# Patient Record
Sex: MALE | Race: WHITE | Employment: FULL TIME | URBAN - METROPOLITAN AREA
[De-identification: names, ages, dates, MRNs, and addresses within clinical notes are randomized per-mention and may not be internally consistent; named-entity substitution may affect disease eponyms.]

---

## 2017-06-29 PROCEDURE — 87086 URINE CULTURE/COLONY COUNT: CPT | Performed by: UROLOGY

## 2018-02-08 PROCEDURE — 87086 URINE CULTURE/COLONY COUNT: CPT | Performed by: UROLOGY

## 2018-03-14 PROCEDURE — 88305 TISSUE EXAM BY PATHOLOGIST: CPT | Performed by: UROLOGY

## 2018-03-14 PROCEDURE — 88344 IMHCHEM/IMCYTCHM EA MLT ANTB: CPT | Performed by: UROLOGY

## 2018-03-29 PROBLEM — C61 PROSTATE CANCER (HCC): Status: ACTIVE | Noted: 2018-03-29

## 2018-10-11 PROCEDURE — 88305 TISSUE EXAM BY PATHOLOGIST: CPT | Performed by: UROLOGY

## 2018-11-29 PROCEDURE — 88305 TISSUE EXAM BY PATHOLOGIST: CPT | Performed by: UROLOGY

## 2021-03-20 ENCOUNTER — LAB ENCOUNTER (OUTPATIENT)
Dept: LAB | Facility: HOSPITAL | Age: 81
End: 2021-03-20
Attending: UROLOGY
Payer: MEDICARE

## 2021-03-20 DIAGNOSIS — C61 PROSTATE CANCER (HCC): ICD-10-CM

## 2021-03-21 LAB — SARS-COV-2 RNA RESP QL NAA+PROBE: NOT DETECTED

## 2021-03-23 ENCOUNTER — HOSPITAL ENCOUNTER (OUTPATIENT)
Facility: HOSPITAL | Age: 81
Discharge: HOME | End: 2021-03-24
Attending: UROLOGY | Admitting: UROLOGY
Payer: MEDICARE

## 2021-03-23 ENCOUNTER — ANESTHESIA EVENT (OUTPATIENT)
Dept: SURGERY | Facility: HOSPITAL | Age: 81
End: 2021-03-23
Payer: MEDICARE

## 2021-03-23 ENCOUNTER — ANESTHESIA (OUTPATIENT)
Dept: SURGERY | Facility: HOSPITAL | Age: 81
End: 2021-03-23
Payer: MEDICARE

## 2021-03-23 DIAGNOSIS — N40.1 HYPERTROPHY OF PROSTATE WITH URINARY OBSTRUCTION: ICD-10-CM

## 2021-03-23 DIAGNOSIS — N13.8 HYPERTROPHY OF PROSTATE WITH URINARY OBSTRUCTION: ICD-10-CM

## 2021-03-23 DIAGNOSIS — N40.3 NODULAR PROSTATE WITH URINARY OBSTRUCTION: ICD-10-CM

## 2021-03-23 DIAGNOSIS — R33.9 RETENTION OF URINE, UNSPECIFIED: ICD-10-CM

## 2021-03-23 DIAGNOSIS — C61 PROSTATE CANCER (HCC): Primary | ICD-10-CM

## 2021-03-23 DIAGNOSIS — N13.8 NODULAR PROSTATE WITH URINARY OBSTRUCTION: ICD-10-CM

## 2021-03-23 LAB
GLUCOSE BLD-MCNC: 133 MG/DL (ref 70–99)
GLUCOSE BLD-MCNC: 145 MG/DL (ref 70–99)
GLUCOSE BLD-MCNC: 149 MG/DL (ref 70–99)
GLUCOSE BLD-MCNC: 158 MG/DL (ref 70–99)

## 2021-03-23 PROCEDURE — 82962 GLUCOSE BLOOD TEST: CPT

## 2021-03-23 PROCEDURE — 0VT08ZZ RESECTION OF PROSTATE, VIA NATURAL OR ARTIFICIAL OPENING ENDOSCOPIC: ICD-10-PCS | Performed by: UROLOGY

## 2021-03-23 PROCEDURE — 88305 TISSUE EXAM BY PATHOLOGIST: CPT | Performed by: UROLOGY

## 2021-03-23 RX ORDER — ATROPA BELLADONNA AND OPIUM 16.2; 6 MG/1; MG/1
SUPPOSITORY RECTAL AS NEEDED
Status: DISCONTINUED | OUTPATIENT
Start: 2021-03-23 | End: 2021-03-23 | Stop reason: HOSPADM

## 2021-03-23 RX ORDER — ATROPA BELLADONNA AND OPIUM 16.2; 6 MG/1; MG/1
1 SUPPOSITORY RECTAL EVERY 6 HOURS PRN
Status: DISCONTINUED | OUTPATIENT
Start: 2021-03-23 | End: 2021-03-24

## 2021-03-23 RX ORDER — SODIUM CHLORIDE, SODIUM LACTATE, POTASSIUM CHLORIDE, CALCIUM CHLORIDE 600; 310; 30; 20 MG/100ML; MG/100ML; MG/100ML; MG/100ML
INJECTION, SOLUTION INTRAVENOUS CONTINUOUS
Status: DISCONTINUED | OUTPATIENT
Start: 2021-03-23 | End: 2021-03-23 | Stop reason: HOSPADM

## 2021-03-23 RX ORDER — CEFAZOLIN SODIUM/WATER 2 G/20 ML
2 SYRINGE (ML) INTRAVENOUS ONCE
Status: COMPLETED | OUTPATIENT
Start: 2021-03-23 | End: 2021-03-23

## 2021-03-23 RX ORDER — ATORVASTATIN CALCIUM 10 MG/1
10 TABLET, FILM COATED ORAL DAILY
Status: DISCONTINUED | OUTPATIENT
Start: 2021-03-24 | End: 2021-03-24

## 2021-03-23 RX ORDER — ONDANSETRON 2 MG/ML
4 INJECTION INTRAMUSCULAR; INTRAVENOUS AS NEEDED
Status: DISCONTINUED | OUTPATIENT
Start: 2021-03-23 | End: 2021-03-23 | Stop reason: HOSPADM

## 2021-03-23 RX ORDER — SODIUM CHLORIDE 9 MG/ML
INJECTION, SOLUTION INTRAVENOUS CONTINUOUS
Status: DISCONTINUED | OUTPATIENT
Start: 2021-03-23 | End: 2021-03-24

## 2021-03-23 RX ORDER — NALOXONE HYDROCHLORIDE 0.4 MG/ML
80 INJECTION, SOLUTION INTRAMUSCULAR; INTRAVENOUS; SUBCUTANEOUS AS NEEDED
Status: DISCONTINUED | OUTPATIENT
Start: 2021-03-23 | End: 2021-03-23 | Stop reason: HOSPADM

## 2021-03-23 RX ORDER — CEFAZOLIN SODIUM/WATER 2 G/20 ML
2 SYRINGE (ML) INTRAVENOUS EVERY 8 HOURS
Status: COMPLETED | OUTPATIENT
Start: 2021-03-23 | End: 2021-03-24

## 2021-03-23 RX ORDER — DOCUSATE SODIUM 100 MG/1
100 CAPSULE, LIQUID FILLED ORAL 2 TIMES DAILY
Status: DISCONTINUED | OUTPATIENT
Start: 2021-03-23 | End: 2021-03-24

## 2021-03-23 RX ORDER — PHENYLEPHRINE HCL 10 MG/ML
VIAL (ML) INJECTION AS NEEDED
Status: DISCONTINUED | OUTPATIENT
Start: 2021-03-23 | End: 2021-03-23 | Stop reason: SURG

## 2021-03-23 RX ORDER — LABETALOL HYDROCHLORIDE 5 MG/ML
5 INJECTION, SOLUTION INTRAVENOUS EVERY 5 MIN PRN
Status: DISCONTINUED | OUTPATIENT
Start: 2021-03-23 | End: 2021-03-23 | Stop reason: HOSPADM

## 2021-03-23 RX ORDER — LIDOCAINE HYDROCHLORIDE 10 MG/ML
INJECTION, SOLUTION EPIDURAL; INFILTRATION; INTRACAUDAL; PERINEURAL AS NEEDED
Status: DISCONTINUED | OUTPATIENT
Start: 2021-03-23 | End: 2021-03-23 | Stop reason: SURG

## 2021-03-23 RX ORDER — ACETAMINOPHEN 10 MG/ML
INJECTION, SOLUTION INTRAVENOUS AS NEEDED
Status: DISCONTINUED | OUTPATIENT
Start: 2021-03-23 | End: 2021-03-23 | Stop reason: SURG

## 2021-03-23 RX ORDER — POLYETHYLENE GLYCOL 3350 17 G/17G
17 POWDER, FOR SOLUTION ORAL DAILY PRN
Status: DISCONTINUED | OUTPATIENT
Start: 2021-03-23 | End: 2021-03-24

## 2021-03-23 RX ORDER — ACETAMINOPHEN 325 MG/1
650 TABLET ORAL EVERY 4 HOURS PRN
Status: DISCONTINUED | OUTPATIENT
Start: 2021-03-23 | End: 2021-03-24

## 2021-03-23 RX ORDER — OXYBUTYNIN CHLORIDE 5 MG/1
5 TABLET ORAL EVERY 6 HOURS PRN
Status: DISCONTINUED | OUTPATIENT
Start: 2021-03-23 | End: 2021-03-24

## 2021-03-23 RX ORDER — SODIUM PHOSPHATE, DIBASIC AND SODIUM PHOSPHATE, MONOBASIC 7; 19 G/133ML; G/133ML
1 ENEMA RECTAL ONCE AS NEEDED
Status: DISCONTINUED | OUTPATIENT
Start: 2021-03-23 | End: 2021-03-24

## 2021-03-23 RX ORDER — LISINOPRIL 20 MG/1
20 TABLET ORAL DAILY
Status: DISCONTINUED | OUTPATIENT
Start: 2021-03-24 | End: 2021-03-24

## 2021-03-23 RX ORDER — PHENAZOPYRIDINE HYDROCHLORIDE 100 MG/1
200 TABLET, FILM COATED ORAL 3 TIMES DAILY PRN
Status: DISCONTINUED | OUTPATIENT
Start: 2021-03-23 | End: 2021-03-24

## 2021-03-23 RX ORDER — HYDROMORPHONE HYDROCHLORIDE 1 MG/ML
0.4 INJECTION, SOLUTION INTRAMUSCULAR; INTRAVENOUS; SUBCUTANEOUS EVERY 5 MIN PRN
Status: DISCONTINUED | OUTPATIENT
Start: 2021-03-23 | End: 2021-03-23 | Stop reason: HOSPADM

## 2021-03-23 RX ORDER — DEXTROSE MONOHYDRATE 25 G/50ML
50 INJECTION, SOLUTION INTRAVENOUS
Status: DISCONTINUED | OUTPATIENT
Start: 2021-03-23 | End: 2021-03-24

## 2021-03-23 RX ORDER — TRAMADOL HYDROCHLORIDE 50 MG/1
50 TABLET ORAL ONCE
Status: DISCONTINUED | OUTPATIENT
Start: 2021-03-23 | End: 2021-03-23 | Stop reason: HOSPADM

## 2021-03-23 RX ORDER — ACETAMINOPHEN 500 MG
1000 TABLET ORAL ONCE
Status: DISCONTINUED | OUTPATIENT
Start: 2021-03-23 | End: 2021-03-23 | Stop reason: HOSPADM

## 2021-03-23 RX ORDER — ONDANSETRON 4 MG/1
4 TABLET, ORALLY DISINTEGRATING ORAL EVERY 6 HOURS PRN
Status: DISCONTINUED | OUTPATIENT
Start: 2021-03-23 | End: 2021-03-24

## 2021-03-23 RX ORDER — ONDANSETRON 2 MG/ML
INJECTION INTRAMUSCULAR; INTRAVENOUS AS NEEDED
Status: DISCONTINUED | OUTPATIENT
Start: 2021-03-23 | End: 2021-03-23 | Stop reason: SURG

## 2021-03-23 RX ORDER — ONDANSETRON 2 MG/ML
4 INJECTION INTRAMUSCULAR; INTRAVENOUS EVERY 6 HOURS PRN
Status: DISCONTINUED | OUTPATIENT
Start: 2021-03-23 | End: 2021-03-24

## 2021-03-23 RX ORDER — HYDROMORPHONE HYDROCHLORIDE 1 MG/ML
INJECTION, SOLUTION INTRAMUSCULAR; INTRAVENOUS; SUBCUTANEOUS
Status: COMPLETED
Start: 2021-03-23 | End: 2021-03-23

## 2021-03-23 RX ORDER — CEFAZOLIN SODIUM/WATER 2 G/20 ML
SYRINGE (ML) INTRAVENOUS
Status: DISPENSED
Start: 2021-03-23 | End: 2021-03-23

## 2021-03-23 RX ORDER — DIPHENHYDRAMINE HYDROCHLORIDE 50 MG/ML
12.5 INJECTION INTRAMUSCULAR; INTRAVENOUS NIGHTLY PRN
Status: DISCONTINUED | OUTPATIENT
Start: 2021-03-23 | End: 2021-03-24

## 2021-03-23 RX ORDER — HYDROCODONE BITARTRATE AND ACETAMINOPHEN 5; 325 MG/1; MG/1
2 TABLET ORAL EVERY 4 HOURS PRN
Status: DISCONTINUED | OUTPATIENT
Start: 2021-03-23 | End: 2021-03-24

## 2021-03-23 RX ORDER — BISACODYL 10 MG
10 SUPPOSITORY, RECTAL RECTAL
Status: DISCONTINUED | OUTPATIENT
Start: 2021-03-23 | End: 2021-03-24

## 2021-03-23 RX ORDER — DEXAMETHASONE SODIUM PHOSPHATE 4 MG/ML
VIAL (ML) INJECTION AS NEEDED
Status: DISCONTINUED | OUTPATIENT
Start: 2021-03-23 | End: 2021-03-23 | Stop reason: SURG

## 2021-03-23 RX ORDER — DIPHENHYDRAMINE HCL 25 MG
25 CAPSULE ORAL NIGHTLY PRN
Status: DISCONTINUED | OUTPATIENT
Start: 2021-03-23 | End: 2021-03-24

## 2021-03-23 RX ORDER — HYDROCODONE BITARTRATE AND ACETAMINOPHEN 5; 325 MG/1; MG/1
1 TABLET ORAL EVERY 4 HOURS PRN
Status: DISCONTINUED | OUTPATIENT
Start: 2021-03-23 | End: 2021-03-24

## 2021-03-23 RX ADMIN — ONDANSETRON 4 MG: 2 INJECTION INTRAMUSCULAR; INTRAVENOUS at 13:00:00

## 2021-03-23 RX ADMIN — LIDOCAINE HYDROCHLORIDE 50 MG: 10 INJECTION, SOLUTION EPIDURAL; INFILTRATION; INTRACAUDAL; PERINEURAL at 12:55:00

## 2021-03-23 RX ADMIN — PHENYLEPHRINE HCL 100 MCG: 10 MG/ML VIAL (ML) INJECTION at 13:35:00

## 2021-03-23 RX ADMIN — PHENYLEPHRINE HCL 100 MCG: 10 MG/ML VIAL (ML) INJECTION at 13:32:00

## 2021-03-23 RX ADMIN — SODIUM CHLORIDE, SODIUM LACTATE, POTASSIUM CHLORIDE, CALCIUM CHLORIDE: 600; 310; 30; 20 INJECTION, SOLUTION INTRAVENOUS at 12:51:00

## 2021-03-23 RX ADMIN — ACETAMINOPHEN 1000 MG: 10 INJECTION, SOLUTION INTRAVENOUS at 13:50:00

## 2021-03-23 RX ADMIN — CEFAZOLIN SODIUM/WATER 2 G: 2 G/20 ML SYRINGE (ML) INTRAVENOUS at 12:58:00

## 2021-03-23 RX ADMIN — DEXAMETHASONE SODIUM PHOSPHATE 4 MG: 4 MG/ML VIAL (ML) INJECTION at 13:00:00

## 2021-03-23 RX ADMIN — SODIUM CHLORIDE, SODIUM LACTATE, POTASSIUM CHLORIDE, CALCIUM CHLORIDE: 600; 310; 30; 20 INJECTION, SOLUTION INTRAVENOUS at 13:47:00

## 2021-03-23 NOTE — ANESTHESIA POSTPROCEDURE EVALUATION
Rosa M Islas 8.  Patient Status:  Outpatient in a Bed   Age/Gender 79year old male MRN [id number]   [facility] SURGERY Attending Janki Malin MD   Hosp Day # 0 PCP KARISSA BRAVO       Anesthesia Post-op Note    CYSTO

## 2021-03-23 NOTE — INTERVAL H&P NOTE
Pre-op Diagnosis: Retention of urine, unspecified [R33.9]  Hypertrophy of prostate with urinary obstruction [N40.1, N13.8]  Nodular prostate with urinary obstruction [N40.3, N13.8]    The above referenced H&P was reviewed by Kaylen Davis MD on 3/23/2021,

## 2021-03-23 NOTE — CONSULTS
General Medicine Consult      Consulted by: Evan Singh MD    PCP: Tito    Reason for Consultation: Medical Management    History of Present Illness: Patient is a 79year old male with PMH including but not limited to colon ca, HTN, HL who p breakfast  docusate sodium, 100 mg, Oral, BID  ceFAZolin, 2 g, Intravenous, Q8H        ALL:    Pravastatin             MYALGIA  Escitalopram            OTHER (SEE COMMENTS)    Comment:Does not remeber  Fentanyl                    Comment:halluncination  Mo medial mortise is noted. There are atherosclerotic changes in the small vessels appreciated. A calcaneal plantar fascial heel spur is also noted. IMPRESSION:  Healing right distal fibular fracture with maintenance of the syndesmosis and medial joint space.

## 2021-03-23 NOTE — OPERATIVE REPORT
[facility]  Urology Procedure Note  Taliameagan Langmayur.  Patient Status:  Outpatient in a Bed    1940 MRN [id number]   [facility] SURGERY Attending Nahed Neri MD   Hosp Day # 0 PCP KARISSA BRAVO     Procedure: Cystoscopy and t Moon 6 prostate cancer and BPH who went into urinary retention and failed voiding trials. The decision was made to perform a TURP. Surgeon:  Dr. Sheron Riedel    Anesthesia:  General    Findings:  BPH with obstruction by lateral lobes.     Specimens: Pr

## 2021-03-23 NOTE — ANESTHESIA PROCEDURE NOTES
Airway  Urgency: elective    Airway not difficult    General Information and Staff    Patient location during procedure: OR  Anesthesiologist: Casey Schrader MD  Resident/CRNA: Kalee Desir CRNA  Performed: CRNA     Indications and Patient Condition  Adilene

## 2021-03-23 NOTE — PLAN OF CARE
A&Ox4. Reports mild pain, declined medication at this time. Denies nausea. Abdomen soft. Bowel sounds hypoactive. 3 way medina catheter in place. CBI at moderate rate, urine pink and clear with small amounts of sediment. IVF infusing. Reviewed plan of care. assistive devices as appropriate  - Consider OT/PT consult to assist with strengthening/mobility  - Encourage toileting schedule  Outcome: Progressing     Problem: DISCHARGE PLANNING  Goal: Discharge to home or other facility with appropriate resources  [employer]

## 2021-03-23 NOTE — ANESTHESIA PREPROCEDURE EVALUATION
PRE-OP EVALUATION    Patient Name: Maggy Barton.     Admit Diagnosis: Retention of urine, unspecified [R33.9]  Hypertrophy of prostate with urinary obstruction [N40.1, N13.8]  Nodular prostate with urinary obstruction [N40.3, N13.8]    Pre-op Diagnosis tablet by mouth daily. , Disp: , Rfl: , 3/16/2021  Coenzyme Q10 (COQ10) 100 MG Oral Cap, Take 1 tablet by mouth daily.   , Disp: , Rfl: , 3/16/2021  ibuprofen (ADVIL) 200 MG Oral Tab, Take 200 mg by mouth every 6 (six) hours as needed for Pain., Disp: , Rf

## 2021-03-24 VITALS
WEIGHT: 182.31 LBS | HEART RATE: 69 BPM | DIASTOLIC BLOOD PRESSURE: 60 MMHG | BODY MASS INDEX: 27.63 KG/M2 | HEIGHT: 68 IN | TEMPERATURE: 99 F | SYSTOLIC BLOOD PRESSURE: 138 MMHG | OXYGEN SATURATION: 96 % | RESPIRATION RATE: 17 BRPM

## 2021-03-24 LAB
ANION GAP SERPL CALC-SCNC: 4 MMOL/L (ref 0–18)
BUN BLD-MCNC: 8 MG/DL (ref 7–18)
BUN/CREAT SERPL: 8.6 (ref 10–20)
CALCIUM BLD-MCNC: 8.3 MG/DL (ref 8.5–10.1)
CHLORIDE SERPL-SCNC: 112 MMOL/L (ref 98–112)
CO2 SERPL-SCNC: 27 MMOL/L (ref 21–32)
CREAT BLD-MCNC: 0.93 MG/DL
DEPRECATED RDW RBC AUTO: 49 FL (ref 35.1–46.3)
ERYTHROCYTE [DISTWIDTH] IN BLOOD BY AUTOMATED COUNT: 13.3 % (ref 11–15)
GLUCOSE BLD-MCNC: 112 MG/DL (ref 70–99)
GLUCOSE BLD-MCNC: 125 MG/DL (ref 70–99)
GLUCOSE BLD-MCNC: 129 MG/DL (ref 70–99)
HCT VFR BLD AUTO: 36.5 %
HGB BLD-MCNC: 11.4 G/DL
MCH RBC QN AUTO: 31.3 PG (ref 26–34)
MCHC RBC AUTO-ENTMCNC: 31.2 G/DL (ref 31–37)
MCV RBC AUTO: 100.3 FL
OSMOLALITY SERPL CALC.SUM OF ELEC: 295 MOSM/KG (ref 275–295)
PLATELET # BLD AUTO: 152 10(3)UL (ref 150–450)
POTASSIUM SERPL-SCNC: 3.7 MMOL/L (ref 3.5–5.1)
RBC # BLD AUTO: 3.64 X10(6)UL
SODIUM SERPL-SCNC: 143 MMOL/L (ref 136–145)
WBC # BLD AUTO: 9.1 X10(3) UL (ref 4–11)

## 2021-03-24 PROCEDURE — 82962 GLUCOSE BLOOD TEST: CPT

## 2021-03-24 PROCEDURE — 85027 COMPLETE CBC AUTOMATED: CPT | Performed by: UROLOGY

## 2021-03-24 PROCEDURE — 80048 BASIC METABOLIC PNL TOTAL CA: CPT | Performed by: UROLOGY

## 2021-03-24 RX ORDER — PHENAZOPYRIDINE HYDROCHLORIDE 200 MG/1
200 TABLET, FILM COATED ORAL 3 TIMES DAILY PRN
Qty: 15 TABLET | Refills: 1 | Status: SHIPPED | OUTPATIENT
Start: 2021-03-24 | End: 2021-11-03

## 2021-03-24 RX ORDER — CEPHALEXIN 500 MG/1
500 CAPSULE ORAL 3 TIMES DAILY
Qty: 9 CAPSULE | Refills: 0 | Status: SHIPPED | OUTPATIENT
Start: 2021-03-24 | End: 2021-03-27

## 2021-03-24 RX ORDER — IBUPROFEN 200 MG
200 TABLET ORAL EVERY 6 HOURS PRN
Refills: 0 | Status: SHIPPED | COMMUNITY
Start: 2021-03-24 | End: 2021-11-03

## 2021-03-24 NOTE — PLAN OF CARE
Pt a/ox4 upon assessment, room air, c/o mild abd pain declined pain medication. Denies n/v tolerating diet. CBI running slow/moderate, sediment noted, draining cherry. Accu QID. IV abx and ivf infusing per mar. POC discussed and pt compliant.  Call light wi precautions as indicated by assessment.  - Educate pt/family on patient safety including physical limitations  - Instruct pt to call for assistance with activity based on assessment  - Modify environment to reduce risk of injury  - Provide assistive device additional Care Plan goals for specific interventions  Outcome: Progressing

## 2021-03-24 NOTE — PROGRESS NOTES
NURSING DISCHARGE NOTE    Discharged Home via Wheelchair. Accompanied by Family member and Support staff  Belongings Taken by patient/family. Patient denies pain. Tolerating diet. IV d/c'd. Educated on medina care. Reviewed discharge instructions.  A

## 2021-03-24 NOTE — PLAN OF CARE
A&Ox4. Denies pain and nausea at this time. 3 way medina catheter in place, with red urine. CBI clamped. I saline locked. Reviewed plan of care.        Problem: Diabetes/Glucose Control  Goal: Glucose maintained within prescribed range  Description: INTERVEN toileting schedule  Outcome: Progressing     Problem: DISCHARGE PLANNING  Goal: Discharge to home or other facility with appropriate resources  Description: INTERVENTIONS:  - Identify barriers to discharge w/pt and caregiver  - Include patient/family/disch

## 2021-03-24 NOTE — PROGRESS NOTES
[facility]  Urology Progress Note    Kt Johnson.  Patient Status:  Outpatient in a Bed    1940 MRN [id number]   [facility] 3NW-A Attending Igor Hamm MD   Hosp Day # 0 PCP KARISSA BRAVO     Subjective:  Arron Means 31.2   RDW 13.3   WBC 9.1   .0       Recent Labs   Lab 03/24/21  0520   *   BUN 8   CREATSERUM 0.93   GFRAA 89   GFRNAA 77   CA 8.3*      K 3.7      CO2 27.0       Urine blood tinged and transparent since CBI clamped.     Patient j

## 2022-10-14 RX ORDER — TAMSULOSIN HYDROCHLORIDE 0.4 MG/1
CAPSULE ORAL
COMMUNITY

## 2022-10-14 RX ORDER — AMLODIPINE BESYLATE 2.5 MG/1
2.5 TABLET ORAL DAILY
COMMUNITY

## 2022-10-16 ENCOUNTER — LAB ENCOUNTER (OUTPATIENT)
Dept: LAB | Age: 82
End: 2022-10-16
Attending: UROLOGY
Payer: MEDICARE

## 2022-10-16 DIAGNOSIS — Z20.822 ENCOUNTER FOR PREOPERATIVE SCREENING LABORATORY TESTING FOR COVID-19 VIRUS: ICD-10-CM

## 2022-10-16 DIAGNOSIS — Z01.812 ENCOUNTER FOR PREOPERATIVE SCREENING LABORATORY TESTING FOR COVID-19 VIRUS: ICD-10-CM

## 2022-10-17 LAB — SARS-COV-2 RNA RESP QL NAA+PROBE: NOT DETECTED

## 2022-10-19 ENCOUNTER — ANESTHESIA EVENT (OUTPATIENT)
Dept: SURGERY | Facility: HOSPITAL | Age: 82
End: 2022-10-19
Payer: MEDICARE

## 2022-10-19 ENCOUNTER — ANESTHESIA (OUTPATIENT)
Dept: SURGERY | Facility: HOSPITAL | Age: 82
End: 2022-10-19
Payer: MEDICARE

## 2022-10-19 ENCOUNTER — HOSPITAL ENCOUNTER (OUTPATIENT)
Facility: HOSPITAL | Age: 82
Setting detail: HOSPITAL OUTPATIENT SURGERY
Discharge: HOME | End: 2022-10-19
Attending: UROLOGY | Admitting: UROLOGY
Payer: MEDICARE

## 2022-10-19 VITALS
WEIGHT: 184.69 LBS | DIASTOLIC BLOOD PRESSURE: 61 MMHG | SYSTOLIC BLOOD PRESSURE: 127 MMHG | TEMPERATURE: 98 F | HEART RATE: 70 BPM | OXYGEN SATURATION: 95 % | HEIGHT: 68 IN | BODY MASS INDEX: 27.99 KG/M2 | RESPIRATION RATE: 16 BRPM

## 2022-10-19 DIAGNOSIS — Z01.812 ENCOUNTER FOR PREOPERATIVE SCREENING LABORATORY TESTING FOR COVID-19 VIRUS: Primary | ICD-10-CM

## 2022-10-19 DIAGNOSIS — Z20.822 ENCOUNTER FOR PREOPERATIVE SCREENING LABORATORY TESTING FOR COVID-19 VIRUS: Primary | ICD-10-CM

## 2022-10-19 DIAGNOSIS — N48.9 PENILE LESION: ICD-10-CM

## 2022-10-19 LAB
GLUCOSE BLD-MCNC: 117 MG/DL (ref 70–99)
GLUCOSE BLD-MCNC: 97 MG/DL (ref 70–99)

## 2022-10-19 PROCEDURE — 82962 GLUCOSE BLOOD TEST: CPT

## 2022-10-19 PROCEDURE — 0HB9XZZ EXCISION OF PERINEUM SKIN, EXTERNAL APPROACH: ICD-10-PCS | Performed by: UROLOGY

## 2022-10-19 PROCEDURE — 88305 TISSUE EXAM BY PATHOLOGIST: CPT | Performed by: UROLOGY

## 2022-10-19 RX ORDER — CEFAZOLIN SODIUM/WATER 2 G/20 ML
SYRINGE (ML) INTRAVENOUS
Status: DISCONTINUED
Start: 2022-10-19 | End: 2022-10-19

## 2022-10-19 RX ORDER — CEFAZOLIN SODIUM/WATER 2 G/20 ML
2 SYRINGE (ML) INTRAVENOUS ONCE
Status: COMPLETED | OUTPATIENT
Start: 2022-10-19 | End: 2022-10-19

## 2022-10-19 RX ORDER — ONDANSETRON 2 MG/ML
4 INJECTION INTRAMUSCULAR; INTRAVENOUS EVERY 6 HOURS PRN
Status: DISCONTINUED | OUTPATIENT
Start: 2022-10-19 | End: 2022-10-19

## 2022-10-19 RX ORDER — SODIUM CHLORIDE, SODIUM LACTATE, POTASSIUM CHLORIDE, CALCIUM CHLORIDE 600; 310; 30; 20 MG/100ML; MG/100ML; MG/100ML; MG/100ML
INJECTION, SOLUTION INTRAVENOUS CONTINUOUS
Status: DISCONTINUED | OUTPATIENT
Start: 2022-10-19 | End: 2022-10-19

## 2022-10-19 RX ORDER — ACETAMINOPHEN 500 MG
1000 TABLET ORAL ONCE AS NEEDED
Status: DISCONTINUED | OUTPATIENT
Start: 2022-10-19 | End: 2022-10-19

## 2022-10-19 RX ORDER — DEXTROSE MONOHYDRATE 25 G/50ML
50 INJECTION, SOLUTION INTRAVENOUS
Status: DISCONTINUED | OUTPATIENT
Start: 2022-10-19 | End: 2022-10-19 | Stop reason: HOSPADM

## 2022-10-19 RX ORDER — ACETAMINOPHEN 500 MG
1000 TABLET ORAL ONCE
Status: DISCONTINUED | OUTPATIENT
Start: 2022-10-19 | End: 2022-10-19 | Stop reason: HOSPADM

## 2022-10-19 RX ORDER — NICOTINE POLACRILEX 4 MG
15 LOZENGE BUCCAL
Status: DISCONTINUED | OUTPATIENT
Start: 2022-10-19 | End: 2022-10-19 | Stop reason: HOSPADM

## 2022-10-19 RX ORDER — HYDROMORPHONE HYDROCHLORIDE 1 MG/ML
0.4 INJECTION, SOLUTION INTRAMUSCULAR; INTRAVENOUS; SUBCUTANEOUS EVERY 5 MIN PRN
Status: DISCONTINUED | OUTPATIENT
Start: 2022-10-19 | End: 2022-10-19

## 2022-10-19 RX ORDER — NICOTINE POLACRILEX 4 MG
30 LOZENGE BUCCAL
Status: DISCONTINUED | OUTPATIENT
Start: 2022-10-19 | End: 2022-10-19 | Stop reason: HOSPADM

## 2022-10-19 RX ORDER — HYDROCODONE BITARTRATE AND ACETAMINOPHEN 5; 325 MG/1; MG/1
1 TABLET ORAL ONCE AS NEEDED
Status: DISCONTINUED | OUTPATIENT
Start: 2022-10-19 | End: 2022-10-19

## 2022-10-19 RX ORDER — HYDROMORPHONE HYDROCHLORIDE 1 MG/ML
0.2 INJECTION, SOLUTION INTRAMUSCULAR; INTRAVENOUS; SUBCUTANEOUS EVERY 5 MIN PRN
Status: DISCONTINUED | OUTPATIENT
Start: 2022-10-19 | End: 2022-10-19

## 2022-10-19 RX ORDER — NALOXONE HYDROCHLORIDE 0.4 MG/ML
80 INJECTION, SOLUTION INTRAMUSCULAR; INTRAVENOUS; SUBCUTANEOUS AS NEEDED
Status: DISCONTINUED | OUTPATIENT
Start: 2022-10-19 | End: 2022-10-19

## 2022-10-19 RX ORDER — LIDOCAINE HYDROCHLORIDE 10 MG/ML
INJECTION, SOLUTION EPIDURAL; INFILTRATION; INTRACAUDAL; PERINEURAL AS NEEDED
Status: DISCONTINUED | OUTPATIENT
Start: 2022-10-19 | End: 2022-10-19 | Stop reason: SURG

## 2022-10-19 RX ORDER — LABETALOL HYDROCHLORIDE 5 MG/ML
5 INJECTION, SOLUTION INTRAVENOUS EVERY 5 MIN PRN
Status: DISCONTINUED | OUTPATIENT
Start: 2022-10-19 | End: 2022-10-19

## 2022-10-19 RX ORDER — HYDROMORPHONE HYDROCHLORIDE 1 MG/ML
0.6 INJECTION, SOLUTION INTRAMUSCULAR; INTRAVENOUS; SUBCUTANEOUS EVERY 5 MIN PRN
Status: DISCONTINUED | OUTPATIENT
Start: 2022-10-19 | End: 2022-10-19

## 2022-10-19 RX ORDER — PHENYLEPHRINE HCL 10 MG/ML
VIAL (ML) INJECTION AS NEEDED
Status: DISCONTINUED | OUTPATIENT
Start: 2022-10-19 | End: 2022-10-19 | Stop reason: SURG

## 2022-10-19 RX ORDER — HYDROCODONE BITARTRATE AND ACETAMINOPHEN 5; 325 MG/1; MG/1
2 TABLET ORAL ONCE AS NEEDED
Status: DISCONTINUED | OUTPATIENT
Start: 2022-10-19 | End: 2022-10-19

## 2022-10-19 RX ORDER — DEXAMETHASONE SODIUM PHOSPHATE 4 MG/ML
VIAL (ML) INJECTION AS NEEDED
Status: DISCONTINUED | OUTPATIENT
Start: 2022-10-19 | End: 2022-10-19 | Stop reason: SURG

## 2022-10-19 RX ORDER — ONDANSETRON 2 MG/ML
INJECTION INTRAMUSCULAR; INTRAVENOUS AS NEEDED
Status: DISCONTINUED | OUTPATIENT
Start: 2022-10-19 | End: 2022-10-19 | Stop reason: SURG

## 2022-10-19 RX ORDER — LIDOCAINE HYDROCHLORIDE 10 MG/ML
INJECTION, SOLUTION INFILTRATION; PERINEURAL AS NEEDED
Status: DISCONTINUED | OUTPATIENT
Start: 2022-10-19 | End: 2022-10-19 | Stop reason: HOSPADM

## 2022-10-19 RX ORDER — METOCLOPRAMIDE HYDROCHLORIDE 5 MG/ML
10 INJECTION INTRAMUSCULAR; INTRAVENOUS EVERY 8 HOURS PRN
Status: DISCONTINUED | OUTPATIENT
Start: 2022-10-19 | End: 2022-10-19

## 2022-10-19 RX ADMIN — ONDANSETRON 4 MG: 2 INJECTION INTRAMUSCULAR; INTRAVENOUS at 15:37:00

## 2022-10-19 RX ADMIN — CEFAZOLIN SODIUM/WATER 2 G: 2 G/20 ML SYRINGE (ML) INTRAVENOUS at 15:35:00

## 2022-10-19 RX ADMIN — PHENYLEPHRINE HCL 100 MCG: 10 MG/ML VIAL (ML) INJECTION at 15:41:00

## 2022-10-19 RX ADMIN — DEXAMETHASONE SODIUM PHOSPHATE 4 MG: 4 MG/ML VIAL (ML) INJECTION at 15:37:00

## 2022-10-19 RX ADMIN — LIDOCAINE HYDROCHLORIDE 50 MG: 10 INJECTION, SOLUTION EPIDURAL; INFILTRATION; INTRACAUDAL; PERINEURAL at 15:32:00

## 2022-10-19 RX ADMIN — SODIUM CHLORIDE, SODIUM LACTATE, POTASSIUM CHLORIDE, CALCIUM CHLORIDE: 600; 310; 30; 20 INJECTION, SOLUTION INTRAVENOUS at 15:28:00

## 2022-10-19 NOTE — ANESTHESIA PROCEDURE NOTES
Airway  Date/Time: 10/19/2022 3:33 PM  Urgency: elective      General Information and Staff    Patient location during procedure: OR  Anesthesiologist: Gonzales Melendez MD  Resident/CRNA: Ella Rosas CRNA  Performed: CRNA     Indications and Patient Condition  Indications for airway management: anesthesia  Spontaneous Ventilation: absent  Sedation level: deep  Preoxygenated: yes  Patient position: sniffing  Mask difficulty assessment: 1 - vent by mask    Final Airway Details  Final airway type: supraglottic airway      Successful airway: classic  Size 3      Number of attempts at approach: 1  Number of other approaches attempted: 0    Additional Comments  Dentition per pre op

## 2022-10-19 NOTE — ANESTHESIA POSTPROCEDURE EVALUATION
Rosa M Islas 8. Patient Status:  Hospital Outpatient Surgery   Age/Gender 79year old male MRN [id number]   Location [address] [facility] Attending Raynald Cranker, MD   Hosp Day # 0 PCP KARISSA BRAVO       Anesthesia Post-op Note    EXCISION OF PENILE LESIONS    Procedure Summary     Date: 10/19/22 Room / Location: [address] [place] [address] OR 15 / [address] [facility] OR    Anesthesia Start: 8332 Anesthesia Stop: 2937    Procedure: EXCISION OF PENILE LESIONS (N/A Penis) Diagnosis: (PENILE LESIONS)    Surgeons: Raynald Cranker, MD Anesthesiologist: Malka Fuentes MD    Anesthesia Type: general ASA Status: 2          Anesthesia Type: general    Vitals Value Taken Time   /70 10/19/22 1559   Temp 97.9 10/19/22 1559   Pulse 66 10/19/22 1558   Resp 12 10/19/22 1559   SpO2 96 % 10/19/22 1558   Vitals shown include unvalidated device data. Patient Location: PACU    Anesthesia Type: general    Airway Patency: patent and extubated    Postop Pain Control: adequate    Mental Status: preanesthetic baseline    Nausea/Vomiting: none    Cardiopulmonary/Hydration status: stable euvolemic    Complications: no apparent anesthesia related complications    Postop vital signs: stable    Dental Exam: Unchanged from Preop    Patient to be discharged from PACU when criteria met.

## 2022-10-19 NOTE — INTERVAL H&P NOTE
Pre-op Diagnosis: PENILE LESIONS    The above referenced H&P was reviewed by Dona Martinez MD on 10/19/2022, the patient was examined and no significant changes have occurred in the patient's condition since the H&P was performed. I discussed with the patient and/or legal representative the potential benefits, risks and side effects of this procedure; the likelihood of the patient achieving goals; and potential problems that might occur during recuperation. I discussed reasonable alternatives to the procedure, including risks, benefits and side effects related to the alternatives and risks related to not receiving this procedure. We will proceed with procedure as planned.

## 2022-10-19 NOTE — OPERATIVE REPORT
[facility]  Urology Procedure Note  Margaret Urbina. Patient Status:  Hospital Outpatient Surgery    1940 MRN [id number]   [facility] SURGERY Attending Lacy Cid MD    Day # 0 PCP KARISSA BRAVO     Procedure: Excision of penile lesion    Pre-Op Diagnosis:  Penile lesion    Post-Op Diagnosis: Same    Procedure: The patient was brought to the operating room where a timeout was performed per protocol. The patient was placed under satisfactory general anesthesia. The airway was controlled with the laryngeal mask. The patient was placed in supine position and was prepped and draped in the usual sterile fashion. There was an erythematous skin lesion up to 1 cm in greatest diameter on the right ventral penis proximal to the glans. The patient was given a penile block with 1% plain lidocaine. The lesion was sharply excised with a small margin and sent for pathology. The base of the excision was fulgurated with the cautery. The incision was closed with interrupted 4-0 chromic sutures. Hemostasis was noted to be excellent at the conclusion of the procedure. The patient was given additional local beneath the incision. The incision was dressed with Bacitracin ointment. The patient tolerated the procedure well. Surgeon: Sofia Anderson    Anesthesia:  General    Findings: There was an erythematous skin lesion up to 1 cm in greatest diameter on the right ventral penis proximal to the glans.     Specimens: Penile lesion    Blood Loss:  0 mL    Complications:  None    Drains:  None    Secondary Diagnosis:  None    Yazan Kraft MD  10/19/2022

## 2023-01-31 ENCOUNTER — ANESTHESIA EVENT (OUTPATIENT)
Dept: ENDOSCOPY | Facility: HOSPITAL | Age: 83
End: 2023-01-31
Payer: MEDICARE

## 2023-01-31 ENCOUNTER — HOSPITAL ENCOUNTER (OUTPATIENT)
Facility: HOSPITAL | Age: 83
Setting detail: OBSERVATION
Discharge: HOME | End: 2023-02-01
Attending: EMERGENCY MEDICINE | Admitting: HOSPITALIST
Payer: MEDICARE

## 2023-01-31 ENCOUNTER — ANESTHESIA (OUTPATIENT)
Dept: ENDOSCOPY | Facility: HOSPITAL | Age: 83
End: 2023-01-31
Payer: MEDICARE

## 2023-01-31 DIAGNOSIS — K62.5 RECTAL BLEEDING: Primary | ICD-10-CM

## 2023-01-31 LAB
ALBUMIN SERPL-MCNC: 3.8 G/DL (ref 3.4–5)
ALBUMIN/GLOB SERPL: 1.2 {RATIO} (ref 1–2)
ALP LIVER SERPL-CCNC: 84 U/L
ALT SERPL-CCNC: 23 U/L
ANION GAP SERPL CALC-SCNC: 3 MMOL/L (ref 0–18)
ANTIBODY SCREEN: NEGATIVE
APTT PPP: 31.2 SECONDS (ref 23.3–35.6)
AST SERPL-CCNC: 18 U/L (ref 15–37)
BASOPHILS # BLD AUTO: 0.03 X10(3) UL (ref 0–0.2)
BASOPHILS NFR BLD AUTO: 0.4 %
BILIRUB SERPL-MCNC: 0.9 MG/DL (ref 0.1–2)
BUN BLD-MCNC: 7 MG/DL (ref 7–18)
CALCIUM BLD-MCNC: 9.3 MG/DL (ref 8.5–10.1)
CHLORIDE SERPL-SCNC: 113 MMOL/L (ref 98–112)
CO2 SERPL-SCNC: 26 MMOL/L (ref 21–32)
CREAT BLD-MCNC: 0.99 MG/DL
EOSINOPHIL # BLD AUTO: 0.17 X10(3) UL (ref 0–0.7)
EOSINOPHIL NFR BLD AUTO: 2.3 %
ERYTHROCYTE [DISTWIDTH] IN BLOOD BY AUTOMATED COUNT: 12.9 %
EST. AVERAGE GLUCOSE BLD GHB EST-MCNC: 148 MG/DL (ref 68–126)
GFR SERPLBLD BASED ON 1.73 SQ M-ARVRAT: 76 ML/MIN/1.73M2 (ref 60–?)
GLOBULIN PLAS-MCNC: 3.2 G/DL (ref 2.8–4.4)
GLUCOSE BLD-MCNC: 103 MG/DL (ref 70–99)
GLUCOSE BLD-MCNC: 113 MG/DL (ref 70–99)
HBA1C MFR BLD: 6.8 % (ref ?–5.7)
HCT VFR BLD AUTO: 43 %
HGB BLD-MCNC: 14 G/DL
IMM GRANULOCYTES # BLD AUTO: 0.02 X10(3) UL (ref 0–1)
IMM GRANULOCYTES NFR BLD: 0.3 %
INR BLD: 1.06 (ref 0.85–1.16)
LYMPHOCYTES # BLD AUTO: 1.51 X10(3) UL (ref 1–4)
LYMPHOCYTES NFR BLD AUTO: 20.2 %
MCH RBC QN AUTO: 31.8 PG (ref 26–34)
MCHC RBC AUTO-ENTMCNC: 32.6 G/DL (ref 31–37)
MCV RBC AUTO: 97.7 FL
MONOCYTES # BLD AUTO: 0.82 X10(3) UL (ref 0.1–1)
MONOCYTES NFR BLD AUTO: 11 %
NEUTROPHILS # BLD AUTO: 4.92 X10 (3) UL (ref 1.5–7.7)
NEUTROPHILS # BLD AUTO: 4.92 X10(3) UL (ref 1.5–7.7)
NEUTROPHILS NFR BLD AUTO: 65.8 %
OSMOLALITY SERPL CALC.SUM OF ELEC: 293 MOSM/KG (ref 275–295)
PLATELET # BLD AUTO: 168 10(3)UL (ref 150–450)
POTASSIUM SERPL-SCNC: 3.7 MMOL/L (ref 3.5–5.1)
PROT SERPL-MCNC: 7 G/DL (ref 6.4–8.2)
PROTHROMBIN TIME: 13.8 SECONDS (ref 11.6–14.8)
RBC # BLD AUTO: 4.4 X10(6)UL
RH BLOOD TYPE: POSITIVE
SARS-COV-2 RNA RESP QL NAA+PROBE: NOT DETECTED
SODIUM SERPL-SCNC: 142 MMOL/L (ref 136–145)
WBC # BLD AUTO: 7.5 X10(3) UL (ref 4–11)

## 2023-01-31 PROCEDURE — 99223 1ST HOSP IP/OBS HIGH 75: CPT | Performed by: HOSPITALIST

## 2023-01-31 PROCEDURE — 0W3P8ZZ CONTROL BLEEDING IN GASTROINTESTINAL TRACT, VIA NATURAL OR ARTIFICIAL OPENING ENDOSCOPIC: ICD-10-PCS | Performed by: INTERNAL MEDICINE

## 2023-01-31 RX ORDER — MELATONIN
3 NIGHTLY PRN
Status: DISCONTINUED | OUTPATIENT
Start: 2023-01-31 | End: 2023-02-01

## 2023-01-31 RX ORDER — NICOTINE POLACRILEX 4 MG
15 LOZENGE BUCCAL
Status: DISCONTINUED | OUTPATIENT
Start: 2023-01-31 | End: 2023-02-01

## 2023-01-31 RX ORDER — DEXTROSE MONOHYDRATE 25 G/50ML
50 INJECTION, SOLUTION INTRAVENOUS
Status: DISCONTINUED | OUTPATIENT
Start: 2023-01-31 | End: 2023-02-01

## 2023-01-31 RX ORDER — LISINOPRIL 20 MG/1
20 TABLET ORAL DAILY
Status: DISCONTINUED | OUTPATIENT
Start: 2023-02-01 | End: 2023-02-01

## 2023-01-31 RX ORDER — SODIUM CHLORIDE, SODIUM LACTATE, POTASSIUM CHLORIDE, CALCIUM CHLORIDE 600; 310; 30; 20 MG/100ML; MG/100ML; MG/100ML; MG/100ML
INJECTION, SOLUTION INTRAVENOUS CONTINUOUS PRN
Status: DISCONTINUED | OUTPATIENT
Start: 2023-01-31 | End: 2023-01-31 | Stop reason: SURG

## 2023-01-31 RX ORDER — ATORVASTATIN CALCIUM 20 MG/1
20 TABLET, FILM COATED ORAL NIGHTLY
Status: DISCONTINUED | OUTPATIENT
Start: 2023-01-31 | End: 2023-02-01

## 2023-01-31 RX ORDER — AMLODIPINE BESYLATE 2.5 MG/1
2.5 TABLET ORAL DAILY
Status: DISCONTINUED | OUTPATIENT
Start: 2023-01-31 | End: 2023-02-01

## 2023-01-31 RX ORDER — TAMSULOSIN HYDROCHLORIDE 0.4 MG/1
0.4 CAPSULE ORAL
Status: DISCONTINUED | OUTPATIENT
Start: 2023-02-01 | End: 2023-02-01

## 2023-01-31 RX ORDER — ONDANSETRON 2 MG/ML
4 INJECTION INTRAMUSCULAR; INTRAVENOUS EVERY 6 HOURS PRN
Status: DISCONTINUED | OUTPATIENT
Start: 2023-01-31 | End: 2023-02-01

## 2023-01-31 RX ORDER — HYDRALAZINE HYDROCHLORIDE 20 MG/ML
10 INJECTION INTRAMUSCULAR; INTRAVENOUS EVERY 6 HOURS PRN
Status: DISCONTINUED | OUTPATIENT
Start: 2023-01-31 | End: 2023-02-01

## 2023-01-31 RX ORDER — ACETAMINOPHEN 500 MG
500 TABLET ORAL EVERY 4 HOURS PRN
Status: DISCONTINUED | OUTPATIENT
Start: 2023-01-31 | End: 2023-02-01

## 2023-01-31 RX ORDER — ATORVASTATIN CALCIUM 10 MG/1
10 TABLET, FILM COATED ORAL NIGHTLY
Status: DISCONTINUED | OUTPATIENT
Start: 2023-01-31 | End: 2023-01-31

## 2023-01-31 RX ORDER — NICOTINE POLACRILEX 4 MG
30 LOZENGE BUCCAL
Status: DISCONTINUED | OUTPATIENT
Start: 2023-01-31 | End: 2023-02-01

## 2023-01-31 RX ADMIN — SODIUM CHLORIDE, SODIUM LACTATE, POTASSIUM CHLORIDE, CALCIUM CHLORIDE: 600; 310; 30; 20 INJECTION, SOLUTION INTRAVENOUS at 20:07:00

## 2023-01-31 NOTE — ED QUICK NOTES
Orders for admission, patient is aware of plan and ready to go upstairs.  Any questions, please call ED BRITT Hernandez [facility] at extension [phone]    Patient Covid vaccination status: Fully vaccinated     COVID Test Ordered in ED: Rapid SARS-CoV-2 by PCR    COVID Suspicion at Admission: Low clinical suspicion for COVID    Running Infusions:  None    Mental Status/LOC at time of transport: alert    Other pertinent information:   CIWA score: N/A   NIH score:  N/A

## 2023-01-31 NOTE — ED INITIAL ASSESSMENT (HPI)
Patient had colonoscopy yesterday @ 1400. Today patient went to bathroom and noticed bright red blood in the toilet.

## 2023-02-01 VITALS
SYSTOLIC BLOOD PRESSURE: 130 MMHG | HEART RATE: 61 BPM | BODY MASS INDEX: 26.48 KG/M2 | RESPIRATION RATE: 20 BRPM | TEMPERATURE: 98 F | OXYGEN SATURATION: 99 % | HEIGHT: 68 IN | DIASTOLIC BLOOD PRESSURE: 64 MMHG | WEIGHT: 174.69 LBS

## 2023-02-01 LAB
ERYTHROCYTE [DISTWIDTH] IN BLOOD BY AUTOMATED COUNT: 12.9 %
GLUCOSE BLD-MCNC: 110 MG/DL (ref 70–99)
GLUCOSE BLD-MCNC: 176 MG/DL (ref 70–99)
HCT VFR BLD AUTO: 37.5 %
HGB BLD-MCNC: 12.6 G/DL
HGB BLD-MCNC: 13.3 G/DL
HGB BLD-MCNC: 13.7 G/DL
MCH RBC QN AUTO: 32.6 PG (ref 26–34)
MCHC RBC AUTO-ENTMCNC: 33.6 G/DL (ref 31–37)
MCV RBC AUTO: 97.2 FL
PLATELET # BLD AUTO: 141 10(3)UL (ref 150–450)
RBC # BLD AUTO: 3.86 X10(6)UL
WBC # BLD AUTO: 4.9 X10(3) UL (ref 4–11)

## 2023-02-01 PROCEDURE — 99239 HOSP IP/OBS DSCHRG MGMT >30: CPT | Performed by: INTERNAL MEDICINE

## 2023-02-01 RX ORDER — POLYETHYLENE GLYCOL 3350 17 G/17G
17 POWDER, FOR SOLUTION ORAL DAILY
Status: DISCONTINUED | OUTPATIENT
Start: 2023-02-01 | End: 2023-02-01

## 2023-02-01 NOTE — PROGRESS NOTES
Called pt    No further bm's    Risk recurrence discussed    He wants to go home    Advised to f/u  W/ Dr Jocelyn Jones        --the patient demonstrated understanding of the results and recommendations and options and the risk/benefit/limitations and alternatives to the plan.   All of their questions and concerns were addressed and were agreeable to the plan as listed

## 2023-02-01 NOTE — ANESTHESIA POSTPROCEDURE EVALUATION
Rosa M Islas 8. Patient Status:  Emergency   Age/Gender 79year old male MRN [id number]   Location [address] [address] Attending No att. providers found   Hosp Day # 0 PCP KARISSA BRAVO       Anesthesia Post-op Note    COLONOSCOPY with clip placement x4    Procedure Summary     Date: 01/31/23 Room / Location: [facility] ENDOSCOPY 03 / [facility] ENDOSCOPY    Anesthesia Start: 2004 Anesthesia Stop: 2036    Procedure: COLONOSCOPY with clip placement x4 Diagnosis: (post polyepectomy bleeding, diverticulosis)    Surgeons: Sabina Cifuentes MD Anesthesiologist: Edward Hatfield MD    Anesthesia Type: MAC ASA Status: 2          Anesthesia Type: MAC    Vitals Value Taken Time   /71 01/31/23 2039   Temp 98 01/31/23 2040   Pulse 66 01/31/23 2039   Resp 21 01/31/23 2039   SpO2 95 % 01/31/23 2039   Vitals shown include unvalidated device data. Patient Location: Endoscopy    Anesthesia Type: MAC    Airway Patency: patent and extubated    Postop Pain Control: adequate    Mental Status: mildly sedated but able to meaningfully participate in the post-anesthesia evaluation    Nausea/Vomiting: none    Cardiopulmonary/Hydration status: stable euvolemic    Complications: no apparent anesthesia related complications    Postop vital signs: stable    Dental Exam: Unchanged from Preop    Patient to be discharged from PACU when criteria met.

## 2023-02-01 NOTE — PLAN OF CARE
NURSING ADMISSION NOTE      Patient admitted via cart. Oriented to room. Safety precautions initiated. Bed in low position. Call light in reach. Received pt alert and oriented x4 with spouse at bedside. VSS. Afebrile. No complaints of pain at this time. Admission navigator completed. PIV flushed and saline locked. Per GI note pt NPO overnight and to monitor for any signs of rebleeding. Safety precautions in place and call light within reach.

## 2023-02-01 NOTE — PROGRESS NOTES
Re-visited w/ pt/wife    Sandra renae's since the kishor    Re-reviewed the op note from yesterday w/ them    Denies any other symptoms. On abd exam, still soft w/out jose raul/guard/rigidity    --plan cscope w/ sedation as per anesthesia shortly      Shaggy Perkins MD  [address] [place] [address] Gastroenterology              Operative Report - Lay Lujan MD - 01/30/2023 2:12 PM CST  Formatting of this note might be different from the original.  [address] REPORT   PATIENT NAME: Luca Morrison  MRN: [id number]  DATE OF OPERATION: 1/30/2023  REFERRING PHYSICIAN: Dr. Raymond See DIAGNOSIS:  Diarrhea   Constipation  POSTOPERATIVE DIAGNOSIS:  Large flat cecal polyp. Removed piecemeal. Endoclips x 7 placed    PROCEDURE PERFORMED: Colonoscopy with conscious sedation  SURGEON: Pham Miller MD   MEDICATIONS: Fentanyl 100 mcg IV and Versed 4 mg IV in divided doses under the supervision of Dr. Pritesh Miller. PROCEDURE AND FINDINGS: The patient was placed into the left lateral decubitus position after informed consent was obtained. All questions were answered. An ASA score was assigned, Mallampati score 2. IV sedation was administered. A rectal exam was performed which was normal. The Olympus video colonoscope was then introduced through the rectum and advanced through the colon to the cecum. The quality of prep was excellent, adequate, Aronchick 1. The cecum was identified by the ileocecal valve and appendiceal orifice. The colonoscope was then withdrawn and the mucosa was further carefully inspected. There was a large flat 20 mm x 30 mm polyp in the cecum colon that was completely removed piecemeal with a hot snare and retrieved. Endoclips x 7 placed to decrease bleeding risk. The remainder of the entire examined colon was otherwise normal. After retroflexion in the rectum, the colonoscope was straightened and removed and the procedure was completed. The patient tolerated the procedure well.  There were no implants placed nor significant blood loss. There were no immediate apparent complications. The patient was given a written copy of their results at discharge. Total moderate sedation time was 30 minutes. A trained sedation nurse was present to assist in monitoring the patient during the entire length of the moderate sedation time. RECOMMENDATIONS   Follow up on pathology. Repeat colonoscopy in 6 months if the polyp(s) is/are adenomatous. Consume a high fiber diet. Rexine Grapes A. Kaylee Felty, MD    Cc: Dr. Debra Fisher, [address] [address], [address]    Electronically signed by Truett Aschoff, MD at 01/30/2023 2:16 PM CST

## 2023-02-01 NOTE — OPERATIVE REPORT
ENDOSCOPY OPERATIVE REPORT    Patient Name:  Sapna Sloan Record #: [id number]  YOB: 1940  Date of Procedure: 1/31/2023    Preoperative Diagnosis:  Rectal bleeding, s/p colonoscopy with polypectomy    Postoperative Diagnosis: poor prep. Diverticulosis. No new/old blood or active bleeding noted, cecal polypectomy site noted and 3 additional clips placed across the site    Procedure Performed: Colonoscopy with  control of bleeding      Anesthesia Given: MAC      Cecal withdrawal time: 14 minutes        Endoscopist:   Cristiane Jeff MD      Procedure:   After the patient was interviewed and the procedure again discussed and questions addressed, the patient was brought to the GI Lab and monitoring of the B/P, pulse, and pulse oximetry was performed. The patient was then placed in the left lateral decubitus position and sedated with divided doses of IV medication; continuous vital signs were monitored throughout the procedure. Medical reconciliation was completed. History and physical were reviewed. Informed consent was obtained. The video colonoscope was inserted into the rectum after a digital rectal exam. The endoscope was advanced to the cecum as identified by the appendiceal orifice and ileocecal valve and then withdrawn. Upon insertion and withdrawl the mucosa was carefully examined and the preparation was Aronchick grade 4 (poor). The patient tolerated the procedure well. Findings:     Endo cap used    Liquid tan/brown stool noted throughout. No new/old blood seen. Prep was poor to evaluate for new or other lesions    Cecal polypectomy site noted across from the ICV, multiple clips present and no active bleeding noted but some areas between the clips had vessel present and I placed 3 new clips between some of the previously placed clips. A 4th clip was attempted but would not approximate the edges and fell off.   Area was monitored but no further bleeding noted    Diverticulosis was noted    The overall appearance of the mucosa was normal.    At the anal verge the endoscope was retroverted,  internal hemorrhoids were seen. No other abnormalities were identified. Throughtout the procedure vital signs were stable. Specimens:  See above      Condition on discharge from procedure:  good      PLAN:    No active bleeding now. Possible he had bleeding remotely but given size of polypectomy site, recurrent bleeding still possible    Prep inadequate to evaluate for new lesions    Would keep npo tonight while monitoring for rebleeding.  If does well tomorrow, consider starting clears    After acute event, should still f/u w/ Dr Alec Dobbs to assess completeness of resection and to evaluate other polyps, path results etc      Discussed w/ wife/pt after procedure    Leo Bear MD  [address] [place] [address] Gastroenterology

## 2023-02-02 NOTE — PROGRESS NOTES
NURSING DISCHARGE NOTE    Discharged Home via Ambulatory. Accompanied by Spouse  Belongings Taken by patient/family. Patient was discharged, IV removed, given discharge papers and answered all questions.

## 2023-02-02 NOTE — PLAN OF CARE
Patient is alert and orientated x4, VSS, afebrile and on RA. Patient was consuming clear liquid diet and advanced to full liquid diet as tolerated. Patient had discomfort and felt full and constipated, was administered Miralax. Patient spouse was at bedside throughout day.     Problem: GASTROINTESTINAL - ADULT  Goal: Minimal or absence of nausea and vomiting  Description: INTERVENTIONS:  - Maintain adequate hydration with IV or PO as ordered and tolerated  - Nasogastric tube to low intermittent suction as ordered  - Evaluate effectiveness of ordered antiemetic medications  - Provide nonpharmacologic comfort measures as appropriate  - Advance diet as tolerated, if ordered  - Obtain nutritional consult as needed  - Evaluate fluid balance  2/1/2023 1805 by Evans Phalen, RN  Outcome: Progressing  2/1/2023 1805 by Evans Phalen, RN  Outcome: Progressing  Goal: Maintains or returns to baseline bowel function  Description: INTERVENTIONS:  - Assess bowel function  - Maintain adequate hydration with IV or PO as ordered and tolerated  - Evaluate effectiveness of GI medications  - Encourage mobilization and activity  - Obtain nutritional consult as needed  - Establish a toileting routine/schedule  - Consider collaborating with pharmacy to review patient's medication profile  2/1/2023 1805 by Evans Phalen, RN  Outcome: Progressing  2/1/2023 1805 by Evans Phalen, RN  Outcome: Progressing  Goal: Maintains adequate nutritional intake (undernourished)  Description: INTERVENTIONS:  - Monitor percentage of each meal consumed  - Identify factors contributing to decreased intake, treat as appropriate  - Assist with meals as needed  - Monitor I&O, WT and lab values  - Obtain nutritional consult as needed  - Optimize oral hygiene and moisture  - Encourage food from home; allow for food preferences  - Enhance eating environment  2/1/2023 1805 by Evans Phalen, RN  Outcome: Progressing  2/1/2023 1805 by Daysi Mayfield RN  Outcome: Progressing  Goal: Achieves appropriate nutritional intake (bariatric)  Description: INTERVENTIONS:  - Monitor for over-consumption  - Identify factors contributing to increased intake, treat as appropriate  - Monitor I&O, WT and lab values  - Obtain nutritional consult as needed  - Evaluate psychosocial factors contributing to over-consumption  2/1/2023 1805 by Daysi Mayfield RN  Outcome: Progressing  2/1/2023 1805 by Daysi Mayfield RN  Outcome: Progressing     Problem: HEMATOLOGIC - ADULT  Goal: Maintains hematologic stability  Description: INTERVENTIONS  - Assess for signs and symptoms of bleeding or hemorrhage  - Monitor labs and vital signs for trends  - Administer supportive blood products/factors, fluids and medications as ordered and appropriate  - Administer supportive blood products/factors as ordered and appropriate  2/1/2023 1805 by Daysi Mayfield RN  Outcome: Progressing  2/1/2023 1805 by Daysi Mayfield RN  Outcome: Progressing  Goal: Free from bleeding injury  Description: (Example usage: patient with low platelets)  INTERVENTIONS:  - Avoid intramuscular injections, enemas and rectal medication administration  - Ensure safe mobilization of patient  - Hold pressure on venipuncture sites to achieve adequate hemostasis  - Assess for signs and symptoms of internal bleeding  - Monitor lab trends  - Patient is to report abnormal signs of bleeding to staff  - Avoid use of toothpicks and dental floss  - Use electric shaver for shaving  - Use soft bristle tooth brush  - Limit straining and forceful nose blowing  2/1/2023 1805 by Daysi Mayfield RN  Outcome: Progressing  2/1/2023 1805 by Daysi Mayfield RN  Outcome: Progressing

## 2023-03-28 ENCOUNTER — LAB REQUISITION (OUTPATIENT)
Dept: LAB | Facility: HOSPITAL | Age: 83
End: 2023-03-28
Payer: MEDICARE

## 2023-03-28 DIAGNOSIS — N21.0 CALCULUS IN BLADDER: ICD-10-CM

## 2023-03-28 PROCEDURE — 82365 CALCULUS SPECTROSCOPY: CPT | Performed by: UROLOGY

## 2023-03-28 PROCEDURE — 88300 SURGICAL PATH GROSS: CPT | Performed by: UROLOGY

## 2023-04-03 LAB — CALCULI MASS: 617 MG

## 2023-11-01 ENCOUNTER — ANESTHESIA EVENT (OUTPATIENT)
Dept: SURGERY | Facility: HOSPITAL | Age: 83
End: 2023-11-01
Payer: MEDICARE

## 2023-11-02 ENCOUNTER — ANESTHESIA (OUTPATIENT)
Dept: SURGERY | Facility: HOSPITAL | Age: 83
End: 2023-11-02
Payer: MEDICARE

## 2023-11-02 ENCOUNTER — HOSPITAL ENCOUNTER (OUTPATIENT)
Facility: HOSPITAL | Age: 83
Discharge: HOME | End: 2023-11-04
Attending: SURGERY | Admitting: SURGERY
Payer: MEDICARE

## 2023-11-02 DIAGNOSIS — E11.9 DIABETES (HCC): ICD-10-CM

## 2023-11-02 DIAGNOSIS — I10 HTN (HYPERTENSION): Primary | ICD-10-CM

## 2023-11-02 PROBLEM — K43.9 VENTRAL HERNIA: Status: ACTIVE | Noted: 2023-11-02

## 2023-11-02 LAB
GLUCOSE BLD-MCNC: 119 MG/DL (ref 70–99)
GLUCOSE BLD-MCNC: 153 MG/DL (ref 70–99)
GLUCOSE BLD-MCNC: 195 MG/DL (ref 70–99)
GLUCOSE BLD-MCNC: 208 MG/DL (ref 70–99)

## 2023-11-02 PROCEDURE — 82962 GLUCOSE BLOOD TEST: CPT

## 2023-11-02 PROCEDURE — 0WUF0JZ SUPPLEMENT ABDOMINAL WALL WITH SYNTHETIC SUBSTITUTE, OPEN APPROACH: ICD-10-PCS | Performed by: SURGERY

## 2023-11-02 PROCEDURE — 76942 ECHO GUIDE FOR BIOPSY: CPT | Performed by: ANESTHESIOLOGY

## 2023-11-02 PROCEDURE — 83036 HEMOGLOBIN GLYCOSYLATED A1C: CPT | Performed by: HOSPITALIST

## 2023-11-02 DEVICE — BARD SOFT MESH
Type: IMPLANTABLE DEVICE | Site: ABDOMEN | Status: FUNCTIONAL
Brand: BARD SOFT MESH

## 2023-11-02 RX ORDER — BISACODYL 10 MG
10 SUPPOSITORY, RECTAL RECTAL
Status: DISCONTINUED | OUTPATIENT
Start: 2023-11-02 | End: 2023-11-04

## 2023-11-02 RX ORDER — ATORVASTATIN CALCIUM 20 MG/1
20 TABLET, FILM COATED ORAL NIGHTLY
Status: DISCONTINUED | OUTPATIENT
Start: 2023-11-02 | End: 2023-11-04

## 2023-11-02 RX ORDER — NALOXONE HYDROCHLORIDE 0.4 MG/ML
80 INJECTION, SOLUTION INTRAMUSCULAR; INTRAVENOUS; SUBCUTANEOUS AS NEEDED
Status: DISCONTINUED | OUTPATIENT
Start: 2023-11-02 | End: 2023-11-02 | Stop reason: HOSPADM

## 2023-11-02 RX ORDER — HYDROCODONE BITARTRATE AND ACETAMINOPHEN 10; 325 MG/1; MG/1
2 TABLET ORAL ONCE AS NEEDED
Status: DISCONTINUED | OUTPATIENT
Start: 2023-11-02 | End: 2023-11-02 | Stop reason: HOSPADM

## 2023-11-02 RX ORDER — SODIUM CHLORIDE, SODIUM LACTATE, POTASSIUM CHLORIDE, CALCIUM CHLORIDE 600; 310; 30; 20 MG/100ML; MG/100ML; MG/100ML; MG/100ML
INJECTION, SOLUTION INTRAVENOUS CONTINUOUS
Status: DISCONTINUED | OUTPATIENT
Start: 2023-11-02 | End: 2023-11-04

## 2023-11-02 RX ORDER — POLYETHYLENE GLYCOL 3350 17 G/17G
17 POWDER, FOR SOLUTION ORAL DAILY PRN
Status: DISCONTINUED | OUTPATIENT
Start: 2023-11-02 | End: 2023-11-04

## 2023-11-02 RX ORDER — METOCLOPRAMIDE HYDROCHLORIDE 5 MG/ML
10 INJECTION INTRAMUSCULAR; INTRAVENOUS EVERY 8 HOURS PRN
Status: DISCONTINUED | OUTPATIENT
Start: 2023-11-02 | End: 2023-11-02 | Stop reason: HOSPADM

## 2023-11-02 RX ORDER — OXYCODONE HYDROCHLORIDE 5 MG/1
2.5 TABLET ORAL EVERY 4 HOURS PRN
Status: DISCONTINUED | OUTPATIENT
Start: 2023-11-02 | End: 2023-11-04

## 2023-11-02 RX ORDER — FAMOTIDINE 20 MG/1
20 TABLET, FILM COATED ORAL 2 TIMES DAILY
Status: DISCONTINUED | OUTPATIENT
Start: 2023-11-02 | End: 2023-11-04

## 2023-11-02 RX ORDER — HYDROMORPHONE HYDROCHLORIDE 1 MG/ML
0.2 INJECTION, SOLUTION INTRAMUSCULAR; INTRAVENOUS; SUBCUTANEOUS EVERY 2 HOUR PRN
Status: DISCONTINUED | OUTPATIENT
Start: 2023-11-02 | End: 2023-11-04

## 2023-11-02 RX ORDER — ENEMA 19; 7 G/133ML; G/133ML
1 ENEMA RECTAL ONCE AS NEEDED
Status: DISCONTINUED | OUTPATIENT
Start: 2023-11-02 | End: 2023-11-04

## 2023-11-02 RX ORDER — METOCLOPRAMIDE HYDROCHLORIDE 5 MG/ML
10 INJECTION INTRAMUSCULAR; INTRAVENOUS EVERY 8 HOURS PRN
Status: DISCONTINUED | OUTPATIENT
Start: 2023-11-02 | End: 2023-11-04

## 2023-11-02 RX ORDER — NICOTINE POLACRILEX 4 MG
30 LOZENGE BUCCAL
Status: DISCONTINUED | OUTPATIENT
Start: 2023-11-02 | End: 2023-11-04

## 2023-11-02 RX ORDER — ACETAMINOPHEN 500 MG
1000 TABLET ORAL ONCE AS NEEDED
Status: DISCONTINUED | OUTPATIENT
Start: 2023-11-02 | End: 2023-11-02 | Stop reason: HOSPADM

## 2023-11-02 RX ORDER — ONDANSETRON 2 MG/ML
INJECTION INTRAMUSCULAR; INTRAVENOUS AS NEEDED
Status: DISCONTINUED | OUTPATIENT
Start: 2023-11-02 | End: 2023-11-02 | Stop reason: SURG

## 2023-11-02 RX ORDER — ACETAMINOPHEN 500 MG
1000 TABLET ORAL ONCE
Status: DISCONTINUED | OUTPATIENT
Start: 2023-11-02 | End: 2023-11-04

## 2023-11-02 RX ORDER — ACETAMINOPHEN 500 MG
1000 TABLET ORAL EVERY 8 HOURS SCHEDULED
Status: DISCONTINUED | OUTPATIENT
Start: 2023-11-02 | End: 2023-11-04

## 2023-11-02 RX ORDER — FAMOTIDINE 10 MG/ML
20 INJECTION, SOLUTION INTRAVENOUS 2 TIMES DAILY
Status: DISCONTINUED | OUTPATIENT
Start: 2023-11-02 | End: 2023-11-04

## 2023-11-02 RX ORDER — SODIUM CHLORIDE, SODIUM LACTATE, POTASSIUM CHLORIDE, CALCIUM CHLORIDE 600; 310; 30; 20 MG/100ML; MG/100ML; MG/100ML; MG/100ML
INJECTION, SOLUTION INTRAVENOUS CONTINUOUS
Status: DISCONTINUED | OUTPATIENT
Start: 2023-11-02 | End: 2023-11-02 | Stop reason: HOSPADM

## 2023-11-02 RX ORDER — HYDROMORPHONE HYDROCHLORIDE 1 MG/ML
0.6 INJECTION, SOLUTION INTRAMUSCULAR; INTRAVENOUS; SUBCUTANEOUS EVERY 5 MIN PRN
Status: DISCONTINUED | OUTPATIENT
Start: 2023-11-02 | End: 2023-11-02 | Stop reason: HOSPADM

## 2023-11-02 RX ORDER — HEPARIN SODIUM 5000 [USP'U]/ML
5000 INJECTION, SOLUTION INTRAVENOUS; SUBCUTANEOUS EVERY 8 HOURS SCHEDULED
Status: DISCONTINUED | OUTPATIENT
Start: 2023-11-03 | End: 2023-11-04

## 2023-11-02 RX ORDER — HYDROMORPHONE HYDROCHLORIDE 1 MG/ML
0.4 INJECTION, SOLUTION INTRAMUSCULAR; INTRAVENOUS; SUBCUTANEOUS EVERY 2 HOUR PRN
Status: DISCONTINUED | OUTPATIENT
Start: 2023-11-02 | End: 2023-11-04

## 2023-11-02 RX ORDER — NICOTINE POLACRILEX 4 MG
15 LOZENGE BUCCAL
Status: DISCONTINUED | OUTPATIENT
Start: 2023-11-02 | End: 2023-11-04

## 2023-11-02 RX ORDER — MELATONIN
3 NIGHTLY PRN
Status: DISCONTINUED | OUTPATIENT
Start: 2023-11-02 | End: 2023-11-04

## 2023-11-02 RX ORDER — HYDROMORPHONE HYDROCHLORIDE 1 MG/ML
0.2 INJECTION, SOLUTION INTRAMUSCULAR; INTRAVENOUS; SUBCUTANEOUS EVERY 5 MIN PRN
Status: DISCONTINUED | OUTPATIENT
Start: 2023-11-02 | End: 2023-11-02 | Stop reason: HOSPADM

## 2023-11-02 RX ORDER — LABETALOL HYDROCHLORIDE 5 MG/ML
5 INJECTION, SOLUTION INTRAVENOUS EVERY 5 MIN PRN
Status: DISCONTINUED | OUTPATIENT
Start: 2023-11-02 | End: 2023-11-02 | Stop reason: HOSPADM

## 2023-11-02 RX ORDER — LISINOPRIL 20 MG/1
20 TABLET ORAL NIGHTLY
Status: DISCONTINUED | OUTPATIENT
Start: 2023-11-03 | End: 2023-11-04

## 2023-11-02 RX ORDER — ONDANSETRON 2 MG/ML
4 INJECTION INTRAMUSCULAR; INTRAVENOUS EVERY 6 HOURS PRN
Status: DISCONTINUED | OUTPATIENT
Start: 2023-11-02 | End: 2023-11-02 | Stop reason: HOSPADM

## 2023-11-02 RX ORDER — HYDROMORPHONE HYDROCHLORIDE 1 MG/ML
INJECTION, SOLUTION INTRAMUSCULAR; INTRAVENOUS; SUBCUTANEOUS
Status: COMPLETED
Start: 2023-11-02 | End: 2023-11-02

## 2023-11-02 RX ORDER — AMLODIPINE BESYLATE 2.5 MG/1
2.5 TABLET ORAL NIGHTLY
Status: DISCONTINUED | OUTPATIENT
Start: 2023-11-02 | End: 2023-11-04

## 2023-11-02 RX ORDER — ONDANSETRON 2 MG/ML
4 INJECTION INTRAMUSCULAR; INTRAVENOUS EVERY 6 HOURS PRN
Status: DISCONTINUED | OUTPATIENT
Start: 2023-11-02 | End: 2023-11-04

## 2023-11-02 RX ORDER — DEXTROSE MONOHYDRATE 25 G/50ML
50 INJECTION, SOLUTION INTRAVENOUS
Status: DISCONTINUED | OUTPATIENT
Start: 2023-11-02 | End: 2023-11-04

## 2023-11-02 RX ORDER — LIDOCAINE HYDROCHLORIDE 10 MG/ML
INJECTION, SOLUTION EPIDURAL; INFILTRATION; INTRACAUDAL; PERINEURAL AS NEEDED
Status: DISCONTINUED | OUTPATIENT
Start: 2023-11-02 | End: 2023-11-02 | Stop reason: SURG

## 2023-11-02 RX ORDER — SENNOSIDES 8.6 MG
17.2 TABLET ORAL NIGHTLY PRN
Status: DISCONTINUED | OUTPATIENT
Start: 2023-11-02 | End: 2023-11-04

## 2023-11-02 RX ORDER — DEXAMETHASONE SODIUM PHOSPHATE 4 MG/ML
VIAL (ML) INJECTION AS NEEDED
Status: DISCONTINUED | OUTPATIENT
Start: 2023-11-02 | End: 2023-11-02 | Stop reason: SURG

## 2023-11-02 RX ORDER — HYDROCODONE BITARTRATE AND ACETAMINOPHEN 10; 325 MG/1; MG/1
1 TABLET ORAL ONCE AS NEEDED
Status: DISCONTINUED | OUTPATIENT
Start: 2023-11-02 | End: 2023-11-02 | Stop reason: HOSPADM

## 2023-11-02 RX ORDER — CEFAZOLIN SODIUM/WATER 2 G/20 ML
2 SYRINGE (ML) INTRAVENOUS ONCE
Status: COMPLETED | OUTPATIENT
Start: 2023-11-02 | End: 2023-11-02

## 2023-11-02 RX ORDER — OXYCODONE HYDROCHLORIDE 5 MG/1
5 TABLET ORAL EVERY 4 HOURS PRN
Status: DISCONTINUED | OUTPATIENT
Start: 2023-11-02 | End: 2023-11-04

## 2023-11-02 RX ORDER — ROCURONIUM BROMIDE 10 MG/ML
INJECTION, SOLUTION INTRAVENOUS AS NEEDED
Status: DISCONTINUED | OUTPATIENT
Start: 2023-11-02 | End: 2023-11-02 | Stop reason: SURG

## 2023-11-02 RX ORDER — HYDROMORPHONE HYDROCHLORIDE 1 MG/ML
0.4 INJECTION, SOLUTION INTRAMUSCULAR; INTRAVENOUS; SUBCUTANEOUS EVERY 5 MIN PRN
Status: DISCONTINUED | OUTPATIENT
Start: 2023-11-02 | End: 2023-11-02 | Stop reason: HOSPADM

## 2023-11-02 RX ADMIN — DEXAMETHASONE SODIUM PHOSPHATE 8 MG: 4 MG/ML VIAL (ML) INJECTION at 15:11:00

## 2023-11-02 RX ADMIN — SODIUM CHLORIDE, SODIUM LACTATE, POTASSIUM CHLORIDE, CALCIUM CHLORIDE: 600; 310; 30; 20 INJECTION, SOLUTION INTRAVENOUS at 18:08:00

## 2023-11-02 RX ADMIN — ROCURONIUM BROMIDE 40 MG: 10 INJECTION, SOLUTION INTRAVENOUS at 15:11:00

## 2023-11-02 RX ADMIN — ROCURONIUM BROMIDE 10 MG: 10 INJECTION, SOLUTION INTRAVENOUS at 16:25:00

## 2023-11-02 RX ADMIN — CEFAZOLIN SODIUM/WATER 2 G: 2 G/20 ML SYRINGE (ML) INTRAVENOUS at 15:06:00

## 2023-11-02 RX ADMIN — ONDANSETRON 4 MG: 2 INJECTION INTRAMUSCULAR; INTRAVENOUS at 17:48:00

## 2023-11-02 RX ADMIN — ROCURONIUM BROMIDE 10 MG: 10 INJECTION, SOLUTION INTRAVENOUS at 16:06:00

## 2023-11-02 RX ADMIN — LIDOCAINE HYDROCHLORIDE 50 MG: 10 INJECTION, SOLUTION EPIDURAL; INFILTRATION; INTRACAUDAL; PERINEURAL at 15:11:00

## 2023-11-02 NOTE — ANESTHESIA PROCEDURE NOTES
Airway  Date/Time: 11/2/2023 3:13 PM  Urgency: elective    Airway not difficult    General Information and Staff    Patient location during procedure: OR  Anesthesiologist: Rin Alvarez MD  Performed: anesthesiologist   Performed by: Rin Alvarez MD  Authorized by: Rin Alvarez MD      Indications and Patient Condition  Indications for airway management: anesthesia  Sedation level: deep  Preoxygenated: yes  Patient position: sniffing  Mask difficulty assessment: 1 - vent by mask    Final Airway Details  Final airway type: endotracheal airway      Successful airway: ETT  Cuffed: yes   Successful intubation technique: direct laryngoscopy  Endotracheal tube insertion site: oral  Blade: Antonio  Blade size: #4  ETT size (mm): 7.5    Cormack-Lehane Classification: grade I - full view of glottis  Placement verified by: capnometry   Measured from: lips  ETT to lips (cm): 22  Number of attempts at approach: 1

## 2023-11-02 NOTE — INTERVAL H&P NOTE
Pre-op Diagnosis: COMPLEX VENTRAL HERNIA    The above referenced H&P was reviewed by Tracy Viera MD on 11/2/2023, the patient was examined and no significant changes have occurred in the patient's condition since the H&P was performed. I discussed with the patient and/or legal representative the potential benefits, risks and side effects of this procedure; the likelihood of the patient achieving goals; and potential problems that might occur during recuperation. I discussed reasonable alternatives to the procedure, including risks, benefits and side effects related to the alternatives and risks related to not receiving this procedure. We will proceed with procedure as planned. I met with the patient and his wife. I reviewed the CT images again. Midline epigastric ventral hernia, smaller right upper quadrant hernia through the rectus muscle. For retrorectus/preperitoneal ventral hernia repair with mesh. The risks, benefits, and alternatives reviewed. He hallucinates with opiates.   Discussed hospital stay, postoperative care, etc.

## 2023-11-02 NOTE — H&P
HPI: 80 yo m patient presents in referral from Dr. Getachew Duran for evaluation of incisional hernias. On 2/6/14 he had a Jose's resection by Dr. Sugar Mckeon for colon cancer at [facility] AT [facility] (high grade adenoca [id number]). LIver met. Had chemo, then met resection by liver resection and reversal of colostomy on 12/4/14 by Dr. Sonal Kincaid at [facility]. No history of incarceration. 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 Felt a \"strain\" in the abd working the [employer]. Past 1-2 yrs noted R side swelling, now L side too. Increasing pain (\"discomfort\"). Some constip. Works out w/o pain. PSH: Above  Occ: Retired  Wife Marisa--I helped care for her sister while inpt. ALLERGIES:  Ativan [Lorazepam] HALLUCINATION  Fentanyl HALLUCINATION  Comment:Hallucination  Morphine HALLUCINATION  Pravastatin MYALGIA  Other OTHER (SEE COMMENTS)  Comment:Unknown pain medications- delirios  Unknown antibiotic- upset stomach  Escitalopram OTHER (SEE COMMENTS)  Comment:Does not remeber  CURRENT MEDS:  Current Outpatient Medications   Medication Sig Dispense Refill   latanoprost 0.005 % Ophthalmic Solution INSTILL 1 DROP INTO BOTH EYES DAILY AT BEDTIME   atorvastatin 20 MG Oral Tab Take 20 mg by mouth daily. tamsulosin (Flomax) cap   tamsulosin (Flomax) cap Take 0.4 mg by mouth As Directed. Polyethylene Glycol 3350 17 GM/SCOOP Oral Powder Miralax 17 gram/dose oral powder  17 gram in water bid   famotidine 20 MG Oral Tab famotidine 20 mg tablet  TAKE 1 TABLET BY MOUTH TWICE A DAY FOR 7 DAYS   meclizine 25 MG Oral Tab meclizine 25 mg tablet  TAKE 1 TABLET BY MOUTH 3 TIMES A DAY AS NEEDED FOR DIZZINESS   amLODIPine 2.5 MG Oral Tab amlodipine 2.5 mg tablet  Take 1 tablet every day by oral route for 90 days. Meth-Hyo-M Bl-Na Phos-Ph Sal (URIBEL) 118 MG Oral Cap Take 118 mg by mouth 4 (four) times daily. 60 capsule 0   acetaminophen 500 MG Oral Tab Take 500 mg by mouth every 6 (six) hours as needed for Pain.    atorvastatin 10 MG Oral Tab Take 10 mg by mouth nightly. lisinopril 20 MG Oral Tab Take 10 mg by mouth daily. MetFORMIN HCl 500 MG Oral Tab Take 500 mg by mouth daily with breakfast.   Multiple Vitamins-Minerals (MULTI-VITAMIN/MINERALS) Oral Tab Take 1 tablet by mouth daily. Cholecalciferol (VITAMIN D3) 18832 units Oral Cap Take 1 tablet by mouth daily. Acidophilus/Pectin (PROBIOTIC) Oral Cap Take 1 capsule by mouth daily. Coenzyme Q10 (COQ10) 100 MG Oral Cap Take 1 tablet by mouth daily. ________________________________________________________________________  ROS:  GENERAL HEALTH: otherwise feels well  HEENT: denies nasal congestion, sinus pain or sore throat  RESPIRATORY: denies shortness of breath, wheezing or cough   CARDIOVASCULAR: denies chest pain or ROBBINS; no palpitations   GI: denies nausea, vomiting, + constipation, no diarrhea; no rectal bleeding; no heartburn; bulging masses around the incisions  GENITAL/: no dysuria, urgency or frequency  HEMATOLOGY: denies hx anemia; denies bruising or excessive bleeding    ________________________________________________________________________  PHYSICAL EXAM:  GENERAL: well developed, well nourished male, in no apparent distress  NECK: supple  RESPIRATORY: lungs clear  CARDIOVASCULAR: RRR  ABDOMEN: long ML inc to R of umb, LUQ trans colost rev inc, large hernia central abd, bulges in RUQ and LUQ, reducible; soft; nondistended; no masses; nontender  GENITAL/: no inguinal hernias  LYMPHATIC: no lymphadenopathy  MUSCULOSKELETAL: no upper or lower extremity problems  EXTREMITIES: no edema    CT UROGRAM(W+WO)(CPT=74178)   CLINICAL INDICATION: Other microscopic hematuria. COMPARISON STUDY: None available. TECHNIQUE: Axial images of the abdomen and pelvis were performed from the lung bases through the   pubic symphysis. Additional images were then obtained after the injection of nonionic intravenous   contrast. Oral contrast was not used for the examination.  80 mL of Isovue 370 were administered   intravenously. Automated exposure control and ALARA manual techniques for patient specific dose reduction were   followed while maintaining the necessary diagnostic image quality. ADVERSE REACTION: None. FINDINGS:   URINARY TRACT: There are no stones visualized in the kidneys, ureters or urinary bladder. A 19 mm   cyst is present in the upper pole the right kidney in an area of cortical deficiency. There appears   to be one or 2 punctate calcifications in the wall of this cyst. A 3.2 mm hypodense structure   located along the dorsal aspect of the right kidney is too small to characterize. A ventral left   renal exophytic cyst measures 72.3 mm. There is no hydronephrosis or hydroureter and no filling   defects are seen within the opacified segments of the urinary tract. PELVIC ORGANS: The diameter of the prostate gland is 58.1 mm. Seminal vesicles are unremarkable. LIVER: Postsurgical deformity and sutures are present in the subdiaphragmatic liver. GALLBLADDER/BILIARY TREE: The gallbladder is not visualized. There is no intrahepatic or   extrahepatic biliary ductal dilatation. PANCREAS: The pancreas is grossly normal in size and homogeneous. There is no pancreatic ductal   dilatation. SPLEEN: The spleen is grossly normal in size and homogeneous without focal solid or cystic lesions. ADRENAL GLANDS: The adrenal glands are normal in size and shape without focal lesions. ABDOMINAL AORTA: The aorta is normal in course and caliber. MESENTERY/RETROPERITONEUM: No abnormally enlarged lymph nodes are seen within the abdomen and   pelvis. BOWEL: There are no mural inflammatory changes of colon or small bowel. The appendix is normal.   There are surgical changes at the level of the sigmoid. Note is made of a small hiatal hernia. ABDOMINAL WALL: There are 3 sizable hernias of the ventral abdominal wall.  A supraumbilical hernia   contains a small segment of transverse colon. A left abdominal wall hernia at the level of the   umbilicus contains a loop of colon. The hernia with a broad neck at the level of the umbilicus   contains small bowel. There is no compromise of the herniated bowel. OSSEOUS STRUCTURES: There are disc and endplate degenerative changes at L3-L4 with a mild   retrolisthesis. There is a benign bone island of the right acetabulum. LUNG BASES: There are no significant masses or consolidations in the visualized lungs. Impression   IMPRESSION:   1. No demonstrable stones of the urinary tract and no evidence of obstruction. 2. Bilateral renal cysts as described above. Right kidney upper pole cyst has one or 2 punctate wall   calcifications. 3. Ventral abdominal wall hernias. No bowel compromise. Images rev'd--the RUQ bulge extends there from the ML hernia. The LUQ bulge is a smaller defect at the colostomy site. EXAM: CT ABDOMEN/PELVIS W/O IV CONTRAST     CLINICAL INDICATION: Colorectal cancer. Surveillance. COMPARISON: CT abdomen pelvis 01/08/2023, 01/09/2022     TECHNIQUE: Multidetector helical CT of the abdomen and pelvis was performed   without intravenous contrast. Coronal and sagittal reconstructions are   evaluated. Automated exposure control utilized. FINDINGS:     Examination limited secondary to lack of IV or oral contrast.     LIVER: Reidentified high density material in the posterior right hepatic   dome likely related to prior posttreatment change. No new focal hepatic   lesion is identified on this unenhanced exam. Gallbladder surgically   absent. No pathologic biliary ductal dilatation. BILIARY: As above   PANCREAS: No lesion, fluid collection, ductal dilatation, or atrophy. SPLEEN: No enlargement or focal lesion. KIDNEYS: Kidneys are symmetric without hydronephrosis or calcification. Left anterior cortical cyst measuring 8 cm.  Right upper pole cortical   scarring with adjacent hypoattenuation measuring 1.6 cm, similar to prior   CT 01/08/2023. No obvious new renal lesion on this unenhanced exam.   ADRENALS: No mass or enlargement. AORTA/VASCULAR: Limited without contrast. No visible aneurysm. Moderate   aortic and branch vessel atherosclerosis. RETROPERITONEUM: No mass or adenopathy. STOMACH/BOWEL/MESENTERY:   Distal colonic suture lines related to prior resection. No pathologic   small bowel or colonic dilatation. No free air or pneumatosis. Upper   anterior midline abdominal wall hernia containing fat and nonobstructed   transverse colon. Separate intra-abdominal wall hernia right of midline   containing multiple nonobstructed small bowel loops. Additional left para   midline hernia containing nonobstructed loops of small bowel. No   inflammatory changes or suggestion of sac incarceration. Normal appendix. No free fluid or ascites. ABDOMINAL WALL: As above. Small fat-containing inguinal hernias. PELVIC NODES: No adenopathy. PELVIC ORGANS: Sequela of prior TURP. Calcification at the TURP defect   which could be related to stone similar to prior exam. Dystrophic calcific   effusions of the prostate, with prostate hypertrophy. Inferior bladder   herniation suggestive of small cystocele. No regional adenopathy. Stable   appearance of the bladder with mild concentric wall thickening. No focal   wall thickening. BONES: Degenerative changes of the lumbar spine. No lytic or blastic   osseous lesion. Degenerative changes of the bilateral hips. LUNG BASES: Discoid scarring/atelectasis left lung base. Left basilar   pulmonary cyst versus bleb. OTHER: None     IMPRESSION:   1. Stable examination without CT evidence to suggest new or progressive   disease on this unenhanced exam.   2. High density material at the superior right hepatic dome related to   prior treatment. No new or enlarging hepatic lesion. 3. Prior distal colonic resection and anastomosis. No bowel obstruction   or ileus. No adenopathy.    4. Chronic additional ancillary findings as detailed above. Electronically Signed by: Delores Tobias MD   Signed on: 6/6/2023 8:17 AM   Workstation ID: [id number]    He'll have the recent CT from [facility] loaded into PACS.    ________________________________________________________________________  ASSESSMENT/ PLAN: H/O stage IV colon cancer, s/p resection and chemotherapy ([facility]). Incisional hernias    Rec: Repair of Incisional hernias with possible mesh    The risks, benefits, and alternatives were discussed with the patient including bleeding, infection, recurrence, bowel injury (could change the type of repair), scar, mesh (patient saw/felt the mesh), recovery, return to work and activity, etc. We discussed the role of laparoscopic and open operations. We discussed that at times there are multiple hernias along the incision line (\"swiss cheese\") which may result in a more extensive surgical repair. Anticipate a long procedure, retrorectus repair to underlay the lateral ones, too. Disc component separation. Offered for him to get additional opinion for consideration for robotic repair if he would like. According to Dr. Cliff Rick recent note, he is NORTH. UTD with scopes--1 in Feb, large polyp, rest out 6 mos later. Has another in a year. Prior op notes rev'd. To review the PACS images when loaded. The brochure on hernias was given to the patient and all questions were answered. Patient to call to schedule the surgery at the hospital (4 hours). SCIP antibiotics/SCDs. Continue the Flomax. Subcut heparin 5000 u preop. He has hallucinations with Fentanyl. May require postop admission. Thank you for involving me in the care of your patient.

## 2023-11-02 NOTE — ANESTHESIA PROCEDURE NOTES
Regional Block    Date/Time: 11/2/2023 5:49 PM    Performed by: Elora Saint, MD  Authorized by: Elora Saint, MD      General Information and Staff    Start Time:  11/2/2023 5:49 PM  End Time:  11/2/2023 5:54 PM  Anesthesiologist:  Elora Saint, MD  Performed by: Anesthesiologist  Patient Location:  OR    Block Placement: Post Induction  Site Identification: real time ultrasound guided and image stored and retrievable    Block site/laterality marked before start: site marked  Reason for Block: at surgeon's request and post-op pain management    Preanesthetic Checklist: 2 patient identifers, IV checked, risks and benefits discussed, monitors and equipment checked, pre-op evaluation, timeout performed, anesthesia consent, sterile technique used, no prohibitive neurological deficits and no local skin infection at insertion site      Procedure Details    Patient Position:  Supine  Prep: ChloraPrep    Monitoring:  Cardiac monitor, continuous pulse ox and blood pressure cuff  Block Type:  TAP  Laterality:  Bilateral  Injection Technique:  Single-shot    Needle    Needle Type:  Short-bevel and echogenic  Needle Gauge:  21 G  Needle Length:  110 mm  Needle Localization:  Ultrasound guidance  Reason for Ultrasound Use: appropriate spread of the medication was noted in real time and no ultrasound evidence of intravascular and/or intraneural injection            Assessment    Injection Assessment:  Good spread noted, negative resistance, negative aspiration for heme, incremental injection and low pressure  Heart Rate Change: No    - Patient tolerated block procedure well without evidence of immediate block related complications.      Medications  11/2/2023 5:49 PM      Additional Comments    Medication:  Bupivacaine 0.25% 30mL  with 1:200,000 epi and 2mg decadron x 2

## 2023-11-03 LAB
ANION GAP SERPL CALC-SCNC: 8 MMOL/L (ref 0–18)
BASOPHILS # BLD AUTO: 0.01 X10(3) UL (ref 0–0.2)
BASOPHILS NFR BLD AUTO: 0.1 %
BUN BLD-MCNC: 18 MG/DL (ref 9–23)
CALCIUM BLD-MCNC: 8.8 MG/DL (ref 8.5–10.1)
CHLORIDE SERPL-SCNC: 108 MMOL/L (ref 98–112)
CO2 SERPL-SCNC: 23 MMOL/L (ref 21–32)
CREAT BLD-MCNC: 1 MG/DL
EGFRCR SERPLBLD CKD-EPI 2021: 74 ML/MIN/1.73M2 (ref 60–?)
EOSINOPHIL # BLD AUTO: 0 X10(3) UL (ref 0–0.7)
EOSINOPHIL NFR BLD AUTO: 0 %
ERYTHROCYTE [DISTWIDTH] IN BLOOD BY AUTOMATED COUNT: 12.8 %
EST. AVERAGE GLUCOSE BLD GHB EST-MCNC: 151 MG/DL (ref 68–126)
GLUCOSE BLD-MCNC: 151 MG/DL (ref 70–99)
GLUCOSE BLD-MCNC: 173 MG/DL (ref 70–99)
GLUCOSE BLD-MCNC: 181 MG/DL (ref 70–99)
GLUCOSE BLD-MCNC: 181 MG/DL (ref 70–99)
GLUCOSE BLD-MCNC: 209 MG/DL (ref 70–99)
HBA1C MFR BLD: 6.9 % (ref ?–5.7)
HCT VFR BLD AUTO: 40 %
HGB BLD-MCNC: 13.2 G/DL
IMM GRANULOCYTES # BLD AUTO: 0.04 X10(3) UL (ref 0–1)
IMM GRANULOCYTES NFR BLD: 0.3 %
LYMPHOCYTES # BLD AUTO: 0.54 X10(3) UL (ref 1–4)
LYMPHOCYTES NFR BLD AUTO: 4.2 %
MCH RBC QN AUTO: 32 PG (ref 26–34)
MCHC RBC AUTO-ENTMCNC: 33 G/DL (ref 31–37)
MCV RBC AUTO: 97.1 FL
MONOCYTES # BLD AUTO: 1.02 X10(3) UL (ref 0.1–1)
MONOCYTES NFR BLD AUTO: 8 %
NEUTROPHILS # BLD AUTO: 11.14 X10 (3) UL (ref 1.5–7.7)
NEUTROPHILS # BLD AUTO: 11.14 X10(3) UL (ref 1.5–7.7)
NEUTROPHILS NFR BLD AUTO: 87.4 %
OSMOLALITY SERPL CALC.SUM OF ELEC: 294 MOSM/KG (ref 275–295)
PLATELET # BLD AUTO: 170 10(3)UL (ref 150–450)
POTASSIUM SERPL-SCNC: 4 MMOL/L (ref 3.5–5.1)
RBC # BLD AUTO: 4.12 X10(6)UL
SODIUM SERPL-SCNC: 139 MMOL/L (ref 136–145)
WBC # BLD AUTO: 12.8 X10(3) UL (ref 4–11)

## 2023-11-03 PROCEDURE — 85025 COMPLETE CBC W/AUTO DIFF WBC: CPT | Performed by: SURGERY

## 2023-11-03 PROCEDURE — 82962 GLUCOSE BLOOD TEST: CPT

## 2023-11-03 PROCEDURE — 80048 BASIC METABOLIC PNL TOTAL CA: CPT | Performed by: SURGERY

## 2023-11-03 NOTE — PROGRESS NOTES
Patient A&Ox4. Vital signs stable on room air. Pain 6/10; tylenol given. asleep upon reassessment. Abdomen soft, distended, tender. Bowel sounds present; active. Patient not passing gas. Denies nausea, vomiting, diarrhea. Midline incision dressed with clean, dry, intact aquacel and treated with an abdominal binder. Cooper catheter in place and draining clear yellow urine. Clear liquid diet being tolerated well. Patient and wife updated on plan of care. Questions and concerns discussed.

## 2023-11-03 NOTE — OPERATIVE REPORT
[facility]         Patient Name: Milan Worley MRN: [id number]     Date of Operation:  2023   Site:  [facility]       : 1940       PREOPERATIVE DIAGNOSES: Multiple large ventral hernias     POSTOPERATIVE DIAGNOSES:  Same     PROCEDURE PERFORMED: Repair of multiple large ventral hernias (retrorectus) with mesh, lysis of adhesions     SURGEON: AUGUSTINE Deal Guardian: Victorio Schilder, CSA and Christy Garcia CSA (skilled services required for positioning, prepping, draping, setting up the field, exposing, retracting, suturing, closing, dressing, etc.)     ANESTHESIA: General     COMPLICATIONS: None     ESTIMATED BLOOD LOSS: 50 mL     SPECIMEN: None     IMPLANTS: Bard soft mesh, approximately 20 x 20 cm piece     GRAFTS: None      DRAINS: None     BLOOD PRODUCTS ADMINISTERED: None     HISTORY: This patient is a 79year old-year-old male who has a history of colon cancer. He underwent a Castellanos's procedure for a large obstructing colon cancer. Postoperatively, he underwent chemotherapy. Follow-up surgery included liver resection of an isolated hepatic metastasis in conjunction with reversal of his colostomy. Nearly 10 years have passed, and he is deemed disease-free. He developed large ventral herniations of the upper midline incision and left mid abdomen colostomy sites. He was evaluated and found to have large ventral hernias, with the midline incisional hernia extending a significant distance into the right subcutaneous tissues. He was seen in surgical consultation, and ventral hernia repair with mesh was recommended. The risks, benefits, and alternatives were discussed with him and his wife, Javi Resendiz. FINDINGS: Large herniation of the upper midline extending over 10 cm in length, separate left abdominal colostomy incision extending approximately 4 cm in length. PROCEDURE: The patient was seen in the preoperative holding area with his wife.  The surgical plan was reviewed. The hernia sites were initialed. He was taken to the operating room, placed in the supine position, and administered general anesthesia. A Cooper catheter was inserted, emptying his bladder. The abdomen was clipped, prepped, and draped, including an [place]. A timeout was performed. He had an upper midline incision extending to below the umbilicus. There was a prominent palpable xiphoid process extending down several centimeters from the costal margin. The midline incision was opened and carried through the scarred subcutaneous tissues to the upper central epigastric hernia site. The hernia sac was dissected down to the fascial edges on both sides laterally and superiorly. There was a dominant defect with wide separation of the rectus muscles. The hernia sac was opened. There were filmy underlying adhesions to bowel loops and the omentum. These were taken down with sharp dissection as needed to facilitate progression with the surgery. The retrorectus space was entered on each side. A retrorectus dissection was performed with sharp and blunt dissection superiorly to the costal margin, and inferiorly. On the left, the prior colostomy site was encountered. The retrorectus dissection was carried all the way around the colostomy site in that plane. The colostomy site itself was then approached. There was a sizable hernia sac with several small bowel loops in it. The hernia sac was tediously dissected out circumferentially and reduced, with aid from internal retraction and visualization through the main defect. When the hernia sac was fully reduced, the defect in the posterior rectus fascia was closed transversely with running 0 Vicryl suture. In similar manner, the hole in the anterior rectus fascia at the colostomy site was closed transversely.   The retrorectus and midline dissection was continued inferiorly, at which point additional herniation was identified in the midline at approximately the level of the umbilicus. This was a large hernia sac that extended into the right subcutaneous tissue in front of the right abdominal wall. The hernia sac was dissected out down to the fascial edges. The hernia merged with the more superior midline hernia, creating a defect over 10 cm in length. The retrorectus plane dissection was continued inferiorly around either side of the inferior edge of the defect. The medial posterior rectus fascia fibers were divided and each side, and the preperitoneal plane was entered along the midline. This was a tedious dissection, due to the scarring beneath the midline incision. The dissection was carried a significant distance inferiorly, creating a single large plane for mesh implantation. In like manner, the medial posterior rectus fascia fibers were divided at the superior end of the dissection, and the preperitoneal space was entered superiorly to complete the plane superiorly. This dissection extended to the costal margin and posterior to the xiphoid process. The dissection of the planes was quite tedious given the degree of scarring and obliteration of the normal planes. Satisfied with the extent of dissection, the intraperitoneal space was inspected, confirming the absence of inadvertent injury internally. The residual omentum was placed beneath the midline. The peritoneal sac was sutured closed with absorbable suture, isolating the peritoneal cavity from the preperitoneal space. The posterior rectus fascia fibers were reapproximated with running 0 Vicryl suture, under mild tension. A sheet of Bard SoftMesh was selected. It was trimmed to approximately 20 x 20 cm in size to fit the defect. It was implanted into the retrorectus and preperitoneal space, lying flat on top of the posterior rectus fascia, and beneath the rectus muscles and midline superiorly and inferiorly.   It provided broad coverage of the layer, and significant underlay of the edges of the defect in all directions. The mesh was sutured to the underside of the fascia and muscle circumferentially with interrupted 2-0 PDS sutures. Inferiorly, the leaves of the mesh extended beyond the midline defect in the retrorectus space a considerable distance. The wound was irrigated and rendered hemostatic. The midline was closed with looped PDS suture. The subcutaneous wound was irrigated and rendered hemostatic. It was reapproximated with multiple interrupted Vicryl sutures for the subcutaneous and deep dermal layer, followed by staples for the skin. An Aquacel dressing was applied. Dr. Nino Gaston performed a bilateral TAP block. A binder was applied. The patient was awakened and transported to recovery. His wife, Melissa Sal, was notified of the findings and his status. The procedure took approximately 3 hours, triple a typical ventral hernia repair, due to the severe scarring and complexity of the dissection and repair.         Enrique Duran MD

## 2023-11-03 NOTE — PLAN OF CARE
Patient alert and orientated x4. Vital signs stable on RA. Reported moderate pain- declined pain medications. Denies chest pain, SOB, and nausea. Abdomen soft and nondistended. Bowel sounds hypoactive. Reports that he is not passing gas yet. Educated on the importance of ambulation. Midline incision w/ aquacel clean, dry, and intact. Abdominal binder in place. DTV. Patient advanced to low fiber/ soft diet. Reports having issues swallowing pills-morning medications given crushed in apple sauce. All questions and concerns addressed. Safety precautions in place. Call light within reach.

## 2023-11-04 VITALS
OXYGEN SATURATION: 94 % | DIASTOLIC BLOOD PRESSURE: 63 MMHG | HEIGHT: 68 IN | SYSTOLIC BLOOD PRESSURE: 137 MMHG | RESPIRATION RATE: 18 BRPM | BODY MASS INDEX: 26.19 KG/M2 | TEMPERATURE: 97 F | WEIGHT: 172.81 LBS | HEART RATE: 88 BPM

## 2023-11-04 LAB
GLUCOSE BLD-MCNC: 150 MG/DL (ref 70–99)
GLUCOSE BLD-MCNC: 150 MG/DL (ref 70–99)

## 2023-11-04 PROCEDURE — 82962 GLUCOSE BLOOD TEST: CPT

## 2023-11-04 RX ORDER — IBUPROFEN 800 MG/1
800 TABLET ORAL EVERY 8 HOURS PRN
Qty: 20 TABLET | Refills: 1 | Status: SHIPPED | OUTPATIENT
Start: 2023-11-04 | End: 2024-01-03

## 2023-11-04 RX ORDER — ACETAMINOPHEN 160 MG/5ML
1000 SOLUTION ORAL EVERY 8 HOURS
Status: DISCONTINUED | OUTPATIENT
Start: 2023-11-04 | End: 2023-11-04

## 2023-11-04 NOTE — DISCHARGE INSTRUCTIONS
-call for fever >101 or severe nausea, vomiting, or pain  -Low fiber diet as tolerated  -call for any severe redness, swelling, or drainage from incision sites  -The Aquacel dressing can be removed on Monday.    -ok to shower on Monday, including getting the staples wet. Keep the staples covered with a light dry bandage to protect it from the binder. Use the binder is much as possible until follow-up  -Ok to take binder off to sleep, if that helps. -No lifting over 10 pounds. No exercise more vigorous than walking. Stairs are okay, with assistance initially.    -Stool softener or other laxative is fine.    -Follow up in office for staple remover in no less than 2 weeks.

## 2023-11-04 NOTE — PLAN OF CARE
Alert, orient. Room air. Pain in abdomen, declined for ice application. Abdominal binder on, Aquacel to midline clean/dry/intact. Denies nausea, tolerating diet. Voiding freely. Hypoactive bowel sounds.

## 2023-11-04 NOTE — PROGRESS NOTES
Alert & oriented x4. VSS on room air. Voids. Tolerating soft diet. Ambulates with standby assist. Bed alarm in place. Midline with aquacel c/d/I with abdominal binder. Denies nausea/chest pain/SOB. Pain controlled. Patient updated on plan of care. Questions and concerns addressed. Safety precautions in place. Frequent rounds performed.

## (undated) DEVICE — STERILE POLYISOPRENE POWDER-FREE SURGICAL GLOVES: Brand: PROTEXIS

## (undated) DEVICE — URINE DRAINAGE BAG,BAG, NEEDLE SAMPLING, DRAIN TUBE: Brand: DOVER

## (undated) DEVICE — ADHESIVE MASTISOL 2/3ML

## (undated) DEVICE — MINI LAP PACK-LF: Brand: MEDLINE INDUSTRIES, INC.

## (undated) DEVICE — SOL H2O 1000ML BTL

## (undated) DEVICE — SUTURE PDS LOOPED 60CM

## (undated) DEVICE — SLEEVE KENDALL SCD EXPRESS MED

## (undated) DEVICE — SUTURE PDS II SZ 2-0 L27IN ABSRB VLT L26MM

## (undated) DEVICE — VIOLET BRAIDED (POLYGLACTIN 910), SYNTHETIC ABSORBABLE SUTURE: Brand: COATED VICRYL

## (undated) DEVICE — CYSTO CDS-LF: Brand: MEDLINE INDUSTRIES, INC.

## (undated) DEVICE — KENDALL SCD EXPRESS SLEEVES, KNEE LENGTH, MEDIUM: Brand: KENDALL SCD

## (undated) DEVICE — SUTURE VCRL SZ 0 L27IN ABSRB UD L36MM CT-1

## (undated) DEVICE — SYRINGE 30ML LL TIP

## (undated) DEVICE — PLASTC TOOMEY SYRNG DISP

## (undated) DEVICE — SLEEVE COMPR M KNEE LEN SGL USE KENDALL SCD

## (undated) DEVICE — SOL  .9 3000ML

## (undated) DEVICE — BINDER ABDOMINAL 0-45IN 3PANEL

## (undated) DEVICE — 3M™ STERI-STRIP™ REINFORCED ADHESIVE SKIN CLOSURES, R1547, 1/2 IN X 4 IN (12 MM X 100 MM), 6 STRIPS/ENVELOPE: Brand: 3M™ STERI-STRIP™

## (undated) DEVICE — Device

## (undated) DEVICE — ADHESIVE SKIN TOP FOR WND CLSR DERMBND ADV

## (undated) DEVICE — DRESSING AQUACEL AG 3.5 X 10

## (undated) DEVICE — SOLUTION  .9 1000ML BTL

## (undated) DEVICE — SUTURE VCRL SZ 3-0 L27IN ABSRB UD L26MM SH

## (undated) DEVICE — PROXIMATE SKIN STAPLERS (35 WIDE) CONTAINS 35 STAINLESS STEEL STAPLES (FIXED HEAD): Brand: PROXIMATE

## (undated) DEVICE — 3M(TM) MICROPORE TAPE DISPENSER 1535-2: Brand: 3M™ MICROPORE™

## (undated) DEVICE — LAPAROTOMY CDS: Brand: MEDLINE INDUSTRIES, INC.

## (undated) DEVICE — SOLUTION IRRIG 1000ML 0.9% NACL USP BTL

## (undated) DEVICE — SUT CHROMIC GUT 4-0 RB-1 U203H

## (undated) DEVICE — HF-RESECTION ELECTRODE PLASMALOOP LOOP, LARGE, 24 FR., 12°/16°, ESG TURIS: Brand: OLYMPUS

## (undated) DEVICE — LIGHT HANDLE

## (undated) DEVICE — 450 ML BOTTLE OF 0.05% CHLORHEXIDINE GLUCONATE IN 99.95% STERILE WATER FOR IRRIGATION, USP AND APPLICATOR.: Brand: IRRISEPT ANTIMICROBIAL WOUND LAVAGE

## (undated) DEVICE — APPLICATOR SKIN PREP 26ML HI LT ORNG 2% CHG

## (undated) DEVICE — SOLUTION SURG DURAPREP 26ML

## (undated) DEVICE — SPNG DRESS 8X4IN NLTX STRL 12

## (undated) DEVICE — BAG DRAIN INFECTION CNTRL 2000

## (undated) DEVICE — 3M™ IOBAN™ 2 ANTIMICROBIAL INCISE DRAPE 6648EZ: Brand: IOBAN™ 2

## (undated) NOTE — LETTER
OUTSIDE TESTING RESULT REQUEST     IMPORTANT: FOR YOUR IMMEDIATE ATTENTION  Please FAX all test results listed below to: [phone]     Testing already done on or about:  10/2023          Patient Name: Jean Carlos Abel  Surgery Date: 2023  Medical Record: [id number]  CSN: [id number]  : 1940 - A: 79 y     Sex: male  Surgeon(s):  Miguel Cardona MD  Procedure: OPEN VENTRAL HERNIA REPAIR WITH MESH  Anesthesia Type: General     Surgeon: Miguel Cardona MD     The following Testing and Time Line are REQUIRED PER ANESTHESIA     Basic Metabolic Panel/ Ekg      Thank You,   Sent by: Grisel STEPHENSON Rn

## (undated) NOTE — LETTER
[facility] Department  Phone: [phone]  OUTSIDE TESTING RESULT REQUEST      TO: Dr. Benjamin Braxton Date: 3/12/21    FAX #: [phone]     IMPORTANT: FOR YOUR IMMEDIATE ATTENTION  Please FAX all test results list

## (undated) NOTE — LETTER
URGENT: SURGERY IS 2023    OUTSIDE TESTING RESULT REQUEST     IMPORTANT: FOR YOUR IMMEDIATE ATTENTION  Please FAX all test results listed below to: [phone]     Testing already done on or about: 10/2023     * * * * If testing is NOT complete, arrange with patient A.S.A.P. * * * *      Patient Name: Anjana Pineda  Surgery Date: 2023  Medical Record: [id number]  CSN: [id number]  : 1940 - A: 79 y     Sex: male  Surgeon(s):  Melvin Kowalski MD  Procedure: OPEN VENTRAL HERNIA REPAIR WITH MESH  Anesthesia Type: General     Surgeon: Melvin Kowalski MD     The following Testing and Time Line are REQUIRED PER ANESTHESIA     EKG READ AND SIGNED WITHIN   90 days      Thank Gabriel Russell,   Sent by: Gricel Fong RN